# Patient Record
Sex: FEMALE | Race: BLACK OR AFRICAN AMERICAN | NOT HISPANIC OR LATINO | Employment: OTHER | URBAN - METROPOLITAN AREA
[De-identification: names, ages, dates, MRNs, and addresses within clinical notes are randomized per-mention and may not be internally consistent; named-entity substitution may affect disease eponyms.]

---

## 2017-05-23 ENCOUNTER — OFFICE VISIT (OUTPATIENT)
Dept: INTERNAL MEDICINE | Facility: CLINIC | Age: 67
End: 2017-05-23
Payer: MEDICARE

## 2017-05-23 VITALS
DIASTOLIC BLOOD PRESSURE: 60 MMHG | HEART RATE: 79 BPM | BODY MASS INDEX: 23.94 KG/M2 | HEIGHT: 69 IN | OXYGEN SATURATION: 99 % | SYSTOLIC BLOOD PRESSURE: 104 MMHG | WEIGHT: 161.63 LBS

## 2017-05-23 DIAGNOSIS — Z78.0 POST-MENOPAUSE: ICD-10-CM

## 2017-05-23 DIAGNOSIS — Z00.00 ANNUAL PHYSICAL EXAM: Primary | ICD-10-CM

## 2017-05-23 DIAGNOSIS — M85.80 OSTEOPENIA, UNSPECIFIED LOCATION: ICD-10-CM

## 2017-05-23 DIAGNOSIS — Z12.31 SCREENING MAMMOGRAM, ENCOUNTER FOR: ICD-10-CM

## 2017-05-23 DIAGNOSIS — Z72.0 TOBACCO ABUSE: ICD-10-CM

## 2017-05-23 DIAGNOSIS — E78.5 HYPERLIPIDEMIA, UNSPECIFIED HYPERLIPIDEMIA TYPE: ICD-10-CM

## 2017-05-23 DIAGNOSIS — Z12.11 SCREENING FOR MALIGNANT NEOPLASM OF COLON: ICD-10-CM

## 2017-05-23 DIAGNOSIS — E55.9 VITAMIN D DEFICIENCY DISEASE: ICD-10-CM

## 2017-05-23 DIAGNOSIS — Z12.2 ENCOUNTER FOR SCREENING FOR LUNG CANCER: ICD-10-CM

## 2017-05-23 DIAGNOSIS — R73.03 PREDIABETES: ICD-10-CM

## 2017-05-23 PROCEDURE — 99999 PR PBB SHADOW E&M-EST. PATIENT-LVL V: CPT | Mod: PBBFAC,,, | Performed by: FAMILY MEDICINE

## 2017-05-23 PROCEDURE — 99499 UNLISTED E&M SERVICE: CPT | Mod: S$GLB,,, | Performed by: FAMILY MEDICINE

## 2017-05-23 PROCEDURE — 99397 PER PM REEVAL EST PAT 65+ YR: CPT | Mod: S$GLB,,, | Performed by: FAMILY MEDICINE

## 2017-05-23 RX ORDER — ATORVASTATIN CALCIUM 20 MG/1
TABLET, FILM COATED ORAL
Qty: 90 TABLET | Refills: 3 | Status: SHIPPED | OUTPATIENT
Start: 2017-05-23 | End: 2018-05-01 | Stop reason: SDUPTHER

## 2017-05-23 RX ORDER — CHOLECALCIFEROL (VITAMIN D3) 25 MCG
1000 TABLET ORAL DAILY
COMMUNITY

## 2017-05-23 NOTE — PROGRESS NOTES
"Subjective:       Patient ID: Verito Lamb is a 66 y.o. female.    Chief Complaint: Annual Exam    HPI   65 y/o with HLPD, Vit d def, Prediabetes, Osteopenia is here for her annual exam  Denies f/n/v/d/constipation/cp/sob, reports she pee's a lot at night for years, she drinks coffee at night, denies dysuria/hematuria/hesitancy.  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   Respiratory: Negative for shortness of breath.    Cardiovascular: Negative for chest pain, palpitations and leg swelling.   Gastrointestinal: Negative for constipation, diarrhea, nausea and vomiting.   Genitourinary: Negative for difficulty urinating and dysuria.   Skin: Negative for rash.   Psychiatric/Behavioral: Negative for sleep disturbance.       Objective:      /60   Pulse 79   Ht 5' 9" (1.753 m)   Wt 73.3 kg (161 lb 9.6 oz)   SpO2 99%   BMI 23.86 kg/m²   Physical Exam   Constitutional: She appears well-developed and well-nourished.   HENT:   Head: Normocephalic and atraumatic.   Mouth/Throat: No oropharyngeal exudate.   edentulous   Neck: Normal range of motion. Neck supple. No thyromegaly present.   Cardiovascular: Normal rate, regular rhythm and normal heart sounds.    Pulmonary/Chest: Effort normal and breath sounds normal. No respiratory distress.   Abdominal: Soft. Bowel sounds are normal. She exhibits no distension.   Musculoskeletal: She exhibits no edema.   Lymphadenopathy:     She has no cervical adenopathy.   Neurological: She is alert.   Skin: Skin is warm and dry.   Psychiatric: She has a normal mood and affect.   Nursing note and vitals reviewed.      Assessment:       1. Annual physical exam    2. Vitamin D deficiency disease    3. Hyperlipidemia, unspecified hyperlipidemia type    4. Prediabetes    5. Screening for " malignant neoplasm of colon    6. Screening mammogram, encounter for    7. Post-menopause    8. Tobacco abuse    9. Encounter for screening for lung cancer    10. Osteopenia, unspecified location        Plan:   Verito was seen today for annual exam.    Diagnoses and all orders for this visit:    Annual physical exam  -     CBC auto differential; Future  -     Comprehensive metabolic panel; Future  -     TSH; Future  -     Urinalysis; Future    Vitamin D deficiency disease  -     Vitamin D; Future    Hyperlipidemia, unspecified hyperlipidemia type  -     atorvastatin (LIPITOR) 20 MG tablet; TAKE 1 TABLET(20 MG) BY MOUTH EVERY DAY  -     Lipid panel; Future  -     Urinalysis; Future    Prediabetes  -     Hemoglobin A1c; Future    Screening for malignant neoplasm of colon  -     Case request GI: COLONOSCOPY    Screening mammogram, encounter for  -     Mammo Digital Screening Bilat with CAD; Future    Post-menopause  -     DXA Bone Density Spine And Hip; Future    Tobacco abuse  -     Ambulatory referral to Smoking Cessation Program    Encounter for screening for lung cancer  -     CT Chest Lung Screening Low Dose; Future    Osteopenia, unspecified location

## 2017-07-25 ENCOUNTER — TELEPHONE (OUTPATIENT)
Dept: INTERNAL MEDICINE | Facility: CLINIC | Age: 67
End: 2017-07-25

## 2017-07-25 ENCOUNTER — HOSPITAL ENCOUNTER (OUTPATIENT)
Dept: RADIOLOGY | Facility: HOSPITAL | Age: 67
Discharge: HOME | End: 2017-07-25
Attending: FAMILY MEDICINE
Payer: MEDICARE

## 2017-07-25 VITALS — HEIGHT: 69 IN | BODY MASS INDEX: 23.85 KG/M2 | WEIGHT: 161 LBS

## 2017-07-25 DIAGNOSIS — Z12.31 SCREENING MAMMOGRAM, ENCOUNTER FOR: ICD-10-CM

## 2017-07-25 PROCEDURE — 77067 SCR MAMMO BI INCL CAD: CPT | Mod: 26,,, | Performed by: RADIOLOGY

## 2017-07-25 PROCEDURE — 77067 SCR MAMMO BI INCL CAD: CPT | Mod: TC

## 2017-07-25 NOTE — TELEPHONE ENCOUNTER
Please let her know that her blood work looks good overall.  Her blood sugar is still in the  Prediabetic range.  Her good cholesterol, the HDL, is too low, please verify that she is taking Lipitor daily,advise that she take an over-the-counter Fish oil supplement daily along with increasing good fat like olive oil in her diet.  Regular activity can help also improve the HDL.

## 2017-07-26 ENCOUNTER — TELEPHONE (OUTPATIENT)
Dept: RADIOLOGY | Facility: HOSPITAL | Age: 67
End: 2017-07-26

## 2017-07-26 NOTE — TELEPHONE ENCOUNTER
Spoke with patient and explained mammogram findings.Patient expressed understanding of results. Patient is scheduled for a abnormal mammogram follow up appointment at [facility] on 8/2/17

## 2017-08-01 ENCOUNTER — TELEPHONE (OUTPATIENT)
Dept: INTERNAL MEDICINE | Facility: CLINIC | Age: 67
End: 2017-08-01

## 2017-08-01 DIAGNOSIS — R31.9 HEMATURIA: Primary | ICD-10-CM

## 2017-08-02 ENCOUNTER — HOSPITAL ENCOUNTER (OUTPATIENT)
Dept: RADIOLOGY | Facility: CLINIC | Age: 67
Discharge: HOME | End: 2017-08-02
Attending: FAMILY MEDICINE
Payer: MEDICARE

## 2017-08-02 DIAGNOSIS — Z78.0 POST-MENOPAUSE: ICD-10-CM

## 2017-08-02 PROCEDURE — 77080 DXA BONE DENSITY AXIAL: CPT | Mod: 26,,, | Performed by: INTERNAL MEDICINE

## 2017-08-07 ENCOUNTER — TELEPHONE (OUTPATIENT)
Dept: INTERNAL MEDICINE | Facility: CLINIC | Age: 67
End: 2017-08-07

## 2017-08-07 NOTE — TELEPHONE ENCOUNTER
Please let her know, her urine culture was normal.  Please let pt know her bone density shows that she has osteopenia, this is a precursor to osteoporosis.  I recommend her take a calcium supplement of 1200mg daily and Vitamin D3 2,000 IU daily.  She should try to incorporate some strength training into her routine.  We can repeat bone scan in 2-4 years.

## 2017-08-07 NOTE — TELEPHONE ENCOUNTER
Inform pt of lab results and to start Ca and D3 vitamins along with strength training. Pt verbalized understanding. Pt has no further questions or concerns.

## 2018-05-01 ENCOUNTER — OFFICE VISIT (OUTPATIENT)
Dept: INTERNAL MEDICINE | Facility: CLINIC | Age: 68
End: 2018-05-01
Payer: MEDICARE

## 2018-05-01 VITALS
BODY MASS INDEX: 25.64 KG/M2 | WEIGHT: 169.19 LBS | HEIGHT: 68 IN | SYSTOLIC BLOOD PRESSURE: 116 MMHG | OXYGEN SATURATION: 97 % | HEART RATE: 92 BPM | DIASTOLIC BLOOD PRESSURE: 72 MMHG

## 2018-05-01 DIAGNOSIS — Z79.899 OTHER LONG TERM (CURRENT) DRUG THERAPY: ICD-10-CM

## 2018-05-01 DIAGNOSIS — R35.0 URINARY FREQUENCY: ICD-10-CM

## 2018-05-01 DIAGNOSIS — M85.80 OSTEOPENIA, UNSPECIFIED LOCATION: ICD-10-CM

## 2018-05-01 DIAGNOSIS — E78.5 HYPERLIPIDEMIA, UNSPECIFIED HYPERLIPIDEMIA TYPE: ICD-10-CM

## 2018-05-01 DIAGNOSIS — Z12.11 SCREENING FOR COLON CANCER: ICD-10-CM

## 2018-05-01 DIAGNOSIS — Z72.0 TOBACCO ABUSE: ICD-10-CM

## 2018-05-01 DIAGNOSIS — Z00.00 ANNUAL PHYSICAL EXAM: Primary | ICD-10-CM

## 2018-05-01 DIAGNOSIS — E55.9 VITAMIN D DEFICIENCY DISEASE: ICD-10-CM

## 2018-05-01 DIAGNOSIS — R73.03 PREDIABETES: ICD-10-CM

## 2018-05-01 LAB
BACTERIA #/AREA URNS AUTO: ABNORMAL /HPF
BILIRUB SERPL-MCNC: ABNORMAL MG/DL
BILIRUB UR QL STRIP: NEGATIVE
BLOOD URINE, POC: ABNORMAL
CLARITY UR REFRACT.AUTO: CLEAR
COLOR UR AUTO: YELLOW
COLOR, POC UA: ABNORMAL
GLUCOSE UR QL STRIP: NEGATIVE
GLUCOSE UR QL STRIP: NORMAL
HGB UR QL STRIP: ABNORMAL
KETONES UR QL STRIP: ABNORMAL
KETONES UR QL STRIP: NEGATIVE
LEUKOCYTE ESTERASE UR QL STRIP: ABNORMAL
LEUKOCYTE ESTERASE URINE, POC: ABNORMAL
MICROSCOPIC COMMENT: ABNORMAL
NITRITE UR QL STRIP: NEGATIVE
NITRITE, POC UA: ABNORMAL
NON-SQ EPI CELLS #/AREA URNS AUTO: <1 /HPF
PH UR STRIP: 6 [PH] (ref 5–8)
PH, POC UA: 5
PROT UR QL STRIP: NEGATIVE
PROTEIN, POC: ABNORMAL
RBC #/AREA URNS AUTO: 20 /HPF (ref 0–4)
SP GR UR STRIP: 1.01 (ref 1–1.03)
SPECIFIC GRAVITY, POC UA: 1.01
SQUAMOUS #/AREA URNS AUTO: 7 /HPF
URN SPEC COLLECT METH UR: ABNORMAL
UROBILINOGEN UR STRIP-ACNC: NEGATIVE EU/DL
UROBILINOGEN, POC UA: NORMAL
WBC #/AREA URNS AUTO: 7 /HPF (ref 0–5)

## 2018-05-01 PROCEDURE — 81002 URINALYSIS NONAUTO W/O SCOPE: CPT | Mod: S$GLB,,, | Performed by: FAMILY MEDICINE

## 2018-05-01 PROCEDURE — 81001 URINALYSIS AUTO W/SCOPE: CPT

## 2018-05-01 PROCEDURE — 99397 PER PM REEVAL EST PAT 65+ YR: CPT | Mod: 25,S$GLB,, | Performed by: FAMILY MEDICINE

## 2018-05-01 PROCEDURE — 99999 PR PBB SHADOW E&M-EST. PATIENT-LVL IV: CPT | Mod: PBBFAC,,, | Performed by: FAMILY MEDICINE

## 2018-05-01 PROCEDURE — 87086 URINE CULTURE/COLONY COUNT: CPT

## 2018-05-01 RX ORDER — ATORVASTATIN CALCIUM 20 MG/1
TABLET, FILM COATED ORAL
Qty: 90 TABLET | Refills: 3 | Status: SHIPPED | OUTPATIENT
Start: 2018-05-01 | End: 2018-10-12

## 2018-05-01 RX ORDER — BUPROPION HYDROCHLORIDE 150 MG/1
150 TABLET ORAL DAILY
Qty: 30 TABLET | Refills: 3 | Status: SHIPPED | OUTPATIENT
Start: 2018-05-01 | End: 2019-03-19

## 2018-05-01 NOTE — PROGRESS NOTES
"Subjective:       Patient ID: Verito Lamb is a 67 y.o. female.    Chief Complaint: Annual Exam    HPI  66 y/o current smoker with HLPD, Vit d def, Prediabetes, Osteopenia is here for her annual exam.  Denies f/n/v/heartburn/d/constipation/cp/sob, reports urinary frequency, she drinks coffee 2-4 cups a day, denies dysuria/hematuria/hesitancy.  Sleeping well, appetite fair, not doing any exercise but walks a lot, taking care of 93 y/o mom with Alzheimers  Script for tdap and zoster given  Tobacco use: 1 pack every 3 days, discussed wellbutrin and chantix, did have smoking cessation calls but it did not help, felt he talked too much; CT lung cancer screen, plans to set up  Eye exam due  Vaccines: gave script for tdap and Shingrix    Review of Systems   Constitutional: Negative for activity change, appetite change, fatigue and fever.   Respiratory: Negative for cough and shortness of breath.    Cardiovascular: Negative for chest pain, palpitations and leg swelling.   Gastrointestinal: Negative for constipation, diarrhea, nausea and vomiting.   Genitourinary: Positive for frequency. Negative for difficulty urinating, dysuria, hematuria and urgency.   Skin: Negative for rash.   Neurological: Negative for dizziness and light-headedness.   Psychiatric/Behavioral: Negative for sleep disturbance.       Objective:      /72   Pulse 92   Ht 5' 8" (1.727 m)   Wt 76.8 kg (169 lb 3.3 oz)   SpO2 97%   BMI 25.73 kg/m²   Physical Exam   Constitutional: She appears well-developed and well-nourished.   HENT:   Head: Normocephalic and atraumatic.   Mouth/Throat: No oropharyngeal exudate.   Neck: Normal range of motion. Neck supple. No thyromegaly present.   Cardiovascular: Normal rate, regular rhythm and normal heart sounds.    Pulmonary/Chest: Effort normal and breath sounds normal. No respiratory distress.   Abdominal: Soft. Bowel sounds are normal. She exhibits no distension. There is no tenderness.   Musculoskeletal: " She exhibits no edema.   Lymphadenopathy:     She has no cervical adenopathy.   Neurological: She is alert.   Skin: Skin is warm and dry.   Psychiatric: She has a normal mood and affect.   Nursing note and vitals reviewed.      Assessment:       1. Annual physical exam    2. Hyperlipidemia, unspecified hyperlipidemia type    3. Prediabetes    4. Vitamin D deficiency disease    5. Tobacco abuse    6. Osteopenia, unspecified location    7. Other long term (current) drug therapy     8. Screening for colon cancer    9. Urinary frequency        Plan:   Verito was seen today for annual exam.    Diagnoses and all orders for this visit:    Annual physical exam  -     CBC auto differential; Future  -     Comprehensive metabolic panel; Future  -     Hemoglobin A1c; Future  -     Lipid panel; Future  -     TSH; Future  -     Vitamin D; Future  -     Ambulatory referral to Optometry    Hyperlipidemia, unspecified hyperlipidemia type  -     atorvastatin (LIPITOR) 20 MG tablet; TAKE 1 TABLET(20 MG) BY MOUTH EVERY DAY  -     Lipid panel; Future  -     Ambulatory Referral to [facility] ([facility])  -     [facility] ([facility])  -     [facility] Patient Entered [facility] ([facility])  -     Ambulatory referral to Optometry    Prediabetes  -     Hemoglobin A1c; Future  -     Ambulatory Referral to [facility] ([facility])  -     [email or web address]  -     [facility] Patient Entered [facility] ([facility])  -     Ambulatory referral to Optometry    Vitamin D deficiency disease  -     Vitamin D; Future    Tobacco abuse  -     buPROPion (WELLBUTRIN XL) 150 MG TB24 tablet; Take 1 tablet (150 mg total) by mouth once daily.    Osteopenia, unspecified location    Other long term (current) drug therapy   -     CBC auto differential; Future  -     TSH; Future    Screening for colon cancer  -     Case request GI: COLONOSCOPY    Urinary frequency  -     POCT URINE DIPSTICK WITHOUT  MICROSCOPE  -     URINALYSIS; Future  -     Urine culture; Future

## 2018-05-02 ENCOUNTER — HOSPITAL ENCOUNTER (OUTPATIENT)
Dept: RADIOLOGY | Facility: HOSPITAL | Age: 68
Discharge: HOME | End: 2018-05-02
Attending: FAMILY MEDICINE
Payer: MEDICARE

## 2018-05-02 ENCOUNTER — TELEPHONE (OUTPATIENT)
Dept: INTERNAL MEDICINE | Facility: CLINIC | Age: 68
End: 2018-05-02

## 2018-05-02 DIAGNOSIS — R92.8 ABNORMAL MAMMOGRAM: ICD-10-CM

## 2018-05-02 PROCEDURE — 77061 BREAST TOMOSYNTHESIS UNI: CPT | Mod: TC,PO

## 2018-05-02 PROCEDURE — 77062 BREAST TOMOSYNTHESIS BI: CPT | Mod: 26,,, | Performed by: RADIOLOGY

## 2018-05-02 PROCEDURE — 77062 BREAST TOMOSYNTHESIS BI: CPT | Mod: TC,PO

## 2018-05-02 PROCEDURE — 77066 DX MAMMO INCL CAD BI: CPT | Mod: 26,,, | Performed by: RADIOLOGY

## 2018-05-02 PROCEDURE — 77065 DX MAMMO INCL CAD UNI: CPT | Mod: TC,PO

## 2018-05-02 NOTE — TELEPHONE ENCOUNTER
Your mammogram is normal, you will need repeat in one year. Your labs look good except your Vitamin D level is too low.  I recommend Vitamin D3 2,000 IU to be taken daily, you can get this over the counter.

## 2018-05-03 LAB — BACTERIA UR CULT: NO GROWTH

## 2018-05-06 ENCOUNTER — TELEPHONE (OUTPATIENT)
Dept: INTERNAL MEDICINE | Facility: CLINIC | Age: 68
End: 2018-05-06

## 2018-06-05 ENCOUNTER — TELEPHONE (OUTPATIENT)
Dept: ENDOSCOPY | Facility: HOSPITAL | Age: 68
End: 2018-06-05

## 2018-06-05 NOTE — TELEPHONE ENCOUNTER
Contacted Pt to schedule colonoscopy, Pt not ready to schedule at this time, Pt to call back to schedule, number provided [phone].

## 2018-06-30 DIAGNOSIS — E78.5 HYPERLIPIDEMIA, UNSPECIFIED HYPERLIPIDEMIA TYPE: ICD-10-CM

## 2018-06-30 RX ORDER — ATORVASTATIN CALCIUM 20 MG/1
TABLET, FILM COATED ORAL
Qty: 90 TABLET | Refills: 0 | Status: SHIPPED | OUTPATIENT
Start: 2018-06-30 | End: 2018-10-12 | Stop reason: SDUPTHER

## 2018-10-12 DIAGNOSIS — E78.5 HYPERLIPIDEMIA, UNSPECIFIED HYPERLIPIDEMIA TYPE: ICD-10-CM

## 2018-10-12 RX ORDER — ATORVASTATIN CALCIUM 20 MG/1
TABLET, FILM COATED ORAL
Qty: 90 TABLET | Refills: 0 | Status: SHIPPED | OUTPATIENT
Start: 2018-10-12 | End: 2019-03-19 | Stop reason: SDUPTHER

## 2018-10-18 ENCOUNTER — PATIENT OUTREACH (OUTPATIENT)
Dept: ADMINISTRATIVE | Facility: HOSPITAL | Age: 68
End: 2018-10-18

## 2018-10-18 NOTE — PROGRESS NOTES
[facility] is committed to your overall health.  To help you get the most out of each of your visits, we will review your information to make sure you are up to date on all of your recommended tests and/or procedures.       Your PCP  Sarita Live MD   found that you may be due for:       Health Maintenance Due   Topic Date Due    TETANUS VACCINE  08/21/1967    Colonoscopy  08/21/1999    Zoster Vaccine  08/21/2009    Influenza Vaccine  08/01/2018             If you have had any of the above done at another facility, please bring the records or information with you so that your record at [facility] will be complete.  If you would like to schedule any of these, please contact me.     If you are currently taking medication, please bring it with you to your appointment for review.     Also, if you have any type of Advanced Directives, please bring them with you to your office visit so we may scan them into your chart.       Thank you for Choosing [facility] for your healthcare needs.        Additional Information  If you have questions, you can email [email or web address] or call [phone]  to talk to our [facility] staff. Remember, [facility] is NOT to be used for urgent needs. For medical emergencies, dial 911.

## 2018-11-30 ENCOUNTER — TELEPHONE (OUTPATIENT)
Dept: ENDOSCOPY | Facility: HOSPITAL | Age: 68
End: 2018-11-30

## 2019-03-06 ENCOUNTER — PATIENT OUTREACH (OUTPATIENT)
Dept: ADMINISTRATIVE | Facility: HOSPITAL | Age: 69
End: 2019-03-06

## 2019-03-06 DIAGNOSIS — Z12.39 SCREENING FOR BREAST CANCER: Primary | ICD-10-CM

## 2019-03-19 ENCOUNTER — OFFICE VISIT (OUTPATIENT)
Dept: INTERNAL MEDICINE | Facility: CLINIC | Age: 69
End: 2019-03-19
Payer: MEDICARE

## 2019-03-19 VITALS
WEIGHT: 161.81 LBS | SYSTOLIC BLOOD PRESSURE: 112 MMHG | HEART RATE: 86 BPM | BODY MASS INDEX: 24.52 KG/M2 | OXYGEN SATURATION: 99 % | DIASTOLIC BLOOD PRESSURE: 74 MMHG | HEIGHT: 68 IN

## 2019-03-19 DIAGNOSIS — M85.80 OSTEOPENIA, UNSPECIFIED LOCATION: ICD-10-CM

## 2019-03-19 DIAGNOSIS — Z12.2 ENCOUNTER FOR SCREENING FOR LUNG CANCER: ICD-10-CM

## 2019-03-19 DIAGNOSIS — E78.5 HYPERLIPIDEMIA, UNSPECIFIED HYPERLIPIDEMIA TYPE: Primary | ICD-10-CM

## 2019-03-19 DIAGNOSIS — Z01.00 EYE EXAM, ROUTINE: ICD-10-CM

## 2019-03-19 DIAGNOSIS — J30.1 SEASONAL ALLERGIC RHINITIS DUE TO POLLEN: ICD-10-CM

## 2019-03-19 DIAGNOSIS — Z87.891 PERSONAL HISTORY OF NICOTINE DEPENDENCE: ICD-10-CM

## 2019-03-19 DIAGNOSIS — R73.03 PREDIABETES: ICD-10-CM

## 2019-03-19 DIAGNOSIS — E55.9 VITAMIN D DEFICIENCY DISEASE: ICD-10-CM

## 2019-03-19 DIAGNOSIS — Z12.9 SCREENING FOR CANCER: ICD-10-CM

## 2019-03-19 DIAGNOSIS — Z72.0 TOBACCO ABUSE: ICD-10-CM

## 2019-03-19 PROCEDURE — 1101F PT FALLS ASSESS-DOCD LE1/YR: CPT | Mod: CPTII,S$GLB,, | Performed by: FAMILY MEDICINE

## 2019-03-19 PROCEDURE — 99499 UNLISTED E&M SERVICE: CPT | Mod: S$GLB,,, | Performed by: FAMILY MEDICINE

## 2019-03-19 PROCEDURE — 99499 RISK ADDL DX/OHS AUDIT: ICD-10-PCS | Mod: S$GLB,,, | Performed by: FAMILY MEDICINE

## 2019-03-19 PROCEDURE — 99999 PR PBB SHADOW E&M-EST. PATIENT-LVL IV: CPT | Mod: PBBFAC,,, | Performed by: FAMILY MEDICINE

## 2019-03-19 PROCEDURE — 1101F PR PT FALLS ASSESS DOC 0-1 FALLS W/OUT INJ PAST YR: ICD-10-PCS | Mod: CPTII,S$GLB,, | Performed by: FAMILY MEDICINE

## 2019-03-19 PROCEDURE — 99214 PR OFFICE/OUTPT VISIT, EST, LEVL IV, 30-39 MIN: ICD-10-PCS | Mod: S$GLB,,, | Performed by: FAMILY MEDICINE

## 2019-03-19 PROCEDURE — 99999 PR PBB SHADOW E&M-EST. PATIENT-LVL IV: ICD-10-PCS | Mod: PBBFAC,,, | Performed by: FAMILY MEDICINE

## 2019-03-19 PROCEDURE — 99214 OFFICE O/P EST MOD 30 MIN: CPT | Mod: S$GLB,,, | Performed by: FAMILY MEDICINE

## 2019-03-19 RX ORDER — FLUTICASONE PROPIONATE 50 MCG
1 SPRAY, SUSPENSION (ML) NASAL DAILY
Qty: 16 G | Refills: 3 | Status: SHIPPED | OUTPATIENT
Start: 2019-03-19 | End: 2019-05-16

## 2019-03-19 RX ORDER — ATORVASTATIN CALCIUM 20 MG/1
TABLET, FILM COATED ORAL
Qty: 90 TABLET | Refills: 1 | Status: SHIPPED | OUTPATIENT
Start: 2019-03-19 | End: 2019-05-16 | Stop reason: SDUPTHER

## 2019-03-19 RX ORDER — LORATADINE 10 MG/1
10 TABLET ORAL DAILY PRN
Qty: 30 TABLET | Refills: 3 | Status: SHIPPED | OUTPATIENT
Start: 2019-03-19 | End: 2021-05-19

## 2019-03-19 NOTE — PROGRESS NOTES
"Subjective:       Patient ID: Verito Lamb is a 68 y.o. female.    Chief Complaint: Hyperlipidemia    HPI  69 y/o current smoker with HLPD, Vit d def, Prediabetes, Osteopenia is here for follow up.    Her mom recently passed away she was 89, she is staying busy with her [employer], she does [employer] study and [employer].  She is coping well, she tries to stay active.   She is feeling good, she has been having runny nose, pnd, sneezing, itchy eyes, she denies cough/sinus pressure/nasal congestion/f/n/v/d/constipation/cp/sob/urinary sx.  She is sleeping ok.    Tobacco use: 1 pack every 3 days, did well with Wellbutrin wants to try again; CT lung cancer screen  GYN: mmg due  Eye exam due  Vaccines: gave script for tdap and Shingrix    Review of Systems   Constitutional: Negative for activity change, appetite change, fatigue and fever.   HENT: Positive for postnasal drip, rhinorrhea and sneezing. Negative for congestion, sinus pressure and sore throat.    Eyes: Positive for itching.   Respiratory: Negative for cough and shortness of breath.    Cardiovascular: Negative for chest pain, palpitations and leg swelling.   Gastrointestinal: Negative for constipation, diarrhea, nausea and vomiting.   Genitourinary: Negative for difficulty urinating and dysuria.   Skin: Negative for rash.   Neurological: Negative for dizziness and light-headedness.   Psychiatric/Behavioral: Negative for sleep disturbance.       Objective:      /74 (BP Location: Right leg, Patient Position: Sitting, BP Method: X-Large (Manual))   Pulse 86   Ht 5' 8" (1.727 m)   Wt 73.4 kg (161 lb 13.1 oz)   SpO2 99%   BMI 24.60 kg/m²   Physical Exam   Constitutional: She appears well-developed and well-nourished.   HENT:   Head: Normocephalic and atraumatic.   Mouth/Throat: No oropharyngeal exudate.   Mild posterior pharyngeal erythema  Inflamed nasal turbinates bilaterally     Neck: Normal range of motion. Neck supple. No thyromegaly present. "   Cardiovascular: Normal rate, regular rhythm and normal heart sounds.   Pulmonary/Chest: Effort normal and breath sounds normal. No respiratory distress.   Abdominal: Soft. Bowel sounds are normal. She exhibits no distension. There is no tenderness.   Musculoskeletal: She exhibits no edema.   Lymphadenopathy:     She has no cervical adenopathy.   Neurological: She is alert.   Skin: Skin is warm and dry.   Psychiatric: She has a normal mood and affect.   Nursing note and vitals reviewed.      Assessment:       1. Hyperlipidemia, unspecified hyperlipidemia type    2. Eye exam, routine    3. Screening for cancer    4. Encounter for screening for lung cancer    5. Prediabetes    6. Tobacco abuse    7. Vitamin D deficiency disease    8. Osteopenia, unspecified location    9. Personal history of nicotine dependence     10. Seasonal allergic rhinitis due to pollen        Plan:   Verito was seen today for hyperlipidemia.    Diagnoses and all orders for this visit:    Hyperlipidemia, unspecified hyperlipidemia type  -     atorvastatin (LIPITOR) 20 MG tablet; TAKE 1 TABLET(20 MG) BY MOUTH EVERY DAY  -     CBC auto differential; Future  -     Comprehensive metabolic panel; Future  -     Lipid panel; Future  -     TSH; Future  -     Urinalysis; Future    Eye exam, routine  -     Ambulatory referral to Optometry    Screening for cancer  -     CT Chest Lung Screening Low Dose; Future    Encounter for screening for lung cancer  -     CT Chest Lung Screening Low Dose; Future    Prediabetes  -     Hemoglobin A1c; Future  -     TSH; Future  -     Urinalysis; Future    Tobacco abuse  -     CT Chest Lung Screening Low Dose; Future    Vitamin D deficiency disease  -     Vitamin D; Future    Osteopenia, unspecified location  -     Vitamin D; Future    Personal history of nicotine dependence   -     CT Chest Lung Screening Low Dose; Future    Seasonal allergic rhinitis due to pollen  -     fluticasone (FLONASE) 50 mcg/actuation nasal  spray; 1 spray (50 mcg total) by Each Nare route once daily.

## 2019-03-26 ENCOUNTER — TELEPHONE (OUTPATIENT)
Dept: INTERNAL MEDICINE | Facility: CLINIC | Age: 69
End: 2019-03-26

## 2019-03-26 ENCOUNTER — PATIENT OUTREACH (OUTPATIENT)
Dept: ADMINISTRATIVE | Facility: HOSPITAL | Age: 69
End: 2019-03-26

## 2019-03-26 NOTE — PROGRESS NOTES
[facility] is committed to your overall health.  To help you get the most out of each of your visits, we will review your information to make sure you are up to date on all of your recommended tests and/or procedures.       Your PCP  Sarita Live MD   found that you may be due for:       Health Maintenance Due   Topic Date Due    TETANUS VACCINE  08/21/1967    Colonoscopy  08/21/1999    Zoster Vaccine  08/21/2009    Influenza Vaccine  08/01/2018             If you have had any of the above done at another facility, please bring the records or information with you so that your record at [facility] will be complete.  If you would like to schedule any of these, please contact me.     If you are currently taking medication, please bring it with you to your appointment for review.     Also, if you have any type of Advanced Directives, please bring them with you to your office visit so we may scan them into your chart.       Thank you for Choosing [facility] for your healthcare needs.        Additional Information  If you have questions, you can email [email or web address] or call [phone]  to talk to our [facility] staff. Remember, [facility] is NOT to be used for urgent needs. For medical emergencies, dial 911.

## 2019-03-26 NOTE — TELEPHONE ENCOUNTER
----- Message from Olive Carranza sent at 3/26/2019  2:24 PM CDT -----  Contact: Pt    Name of Who is Calling:LOS BANDA [[id number]]    What is the request in detail: Patient would like a call back regarding orders for blood work , patient states no one ever called her back . Patient states she did not get her vitamin D 1000 units Tab and loratadine (CLARITIN) 10 mg tablet    Can the clinic reply by [facility]: no    What Number to Call Back [facility] not [facility]: [phone]

## 2019-04-25 ENCOUNTER — PATIENT OUTREACH (OUTPATIENT)
Dept: ADMINISTRATIVE | Facility: HOSPITAL | Age: 69
End: 2019-04-25

## 2019-04-25 NOTE — PROGRESS NOTES
Chart review completed. I did speak to the patient about getting her colonoscopy and that a case request will be placed per Dr. Live. She verbalized understanding.      Yanet REYNOSO LPN  Clinical Care Coordinator  Internal Medicine  [facility]

## 2019-05-02 ENCOUNTER — LAB VISIT (OUTPATIENT)
Dept: LAB | Facility: HOSPITAL | Age: 69
End: 2019-05-02
Attending: FAMILY MEDICINE
Payer: MEDICARE

## 2019-05-02 DIAGNOSIS — M85.80 OSTEOPENIA, UNSPECIFIED LOCATION: ICD-10-CM

## 2019-05-02 DIAGNOSIS — R73.03 PREDIABETES: ICD-10-CM

## 2019-05-02 DIAGNOSIS — E78.5 HYPERLIPIDEMIA, UNSPECIFIED HYPERLIPIDEMIA TYPE: ICD-10-CM

## 2019-05-02 DIAGNOSIS — E55.9 VITAMIN D DEFICIENCY DISEASE: ICD-10-CM

## 2019-05-02 LAB
25(OH)D3+25(OH)D2 SERPL-MCNC: 20 NG/ML (ref 30–96)
ALBUMIN SERPL BCP-MCNC: 3.6 G/DL (ref 3.5–5.2)
ALP SERPL-CCNC: 81 U/L (ref 55–135)
ALT SERPL W/O P-5'-P-CCNC: 13 U/L (ref 10–44)
ANION GAP SERPL CALC-SCNC: 8 MMOL/L (ref 8–16)
AST SERPL-CCNC: 17 U/L (ref 10–40)
BASOPHILS # BLD AUTO: 0.01 K/UL (ref 0–0.2)
BASOPHILS NFR BLD: 0.2 % (ref 0–1.9)
BILIRUB SERPL-MCNC: 0.5 MG/DL (ref 0.1–1)
BUN SERPL-MCNC: 13 MG/DL (ref 8–23)
CALCIUM SERPL-MCNC: 9.2 MG/DL (ref 8.7–10.5)
CHLORIDE SERPL-SCNC: 107 MMOL/L (ref 95–110)
CHOLEST SERPL-MCNC: 250 MG/DL (ref 120–199)
CHOLEST/HDLC SERPL: 5.8 {RATIO} (ref 2–5)
CO2 SERPL-SCNC: 25 MMOL/L (ref 23–29)
CREAT SERPL-MCNC: 0.8 MG/DL (ref 0.5–1.4)
DIFFERENTIAL METHOD: ABNORMAL
EOSINOPHIL # BLD AUTO: 0 K/UL (ref 0–0.5)
EOSINOPHIL NFR BLD: 0 % (ref 0–8)
ERYTHROCYTE [DISTWIDTH] IN BLOOD BY AUTOMATED COUNT: 14.7 % (ref 11.5–14.5)
EST. GFR  (AFRICAN AMERICAN): >60 ML/MIN/1.73 M^2
EST. GFR  (NON AFRICAN AMERICAN): >60 ML/MIN/1.73 M^2
ESTIMATED AVG GLUCOSE: 114 MG/DL (ref 68–131)
GLUCOSE SERPL-MCNC: 86 MG/DL (ref 70–110)
HBA1C MFR BLD HPLC: 5.6 % (ref 4–5.6)
HCT VFR BLD AUTO: 37.8 % (ref 37–48.5)
HDLC SERPL-MCNC: 43 MG/DL (ref 40–75)
HDLC SERPL: 17.2 % (ref 20–50)
HGB BLD-MCNC: 12.4 G/DL (ref 12–16)
LDLC SERPL CALC-MCNC: 190.6 MG/DL (ref 63–159)
LYMPHOCYTES # BLD AUTO: 2.3 K/UL (ref 1–4.8)
LYMPHOCYTES NFR BLD: 40.8 % (ref 18–48)
MCH RBC QN AUTO: 29.6 PG (ref 27–31)
MCHC RBC AUTO-ENTMCNC: 32.8 G/DL (ref 32–36)
MCV RBC AUTO: 90 FL (ref 82–98)
MONOCYTES # BLD AUTO: 0.4 K/UL (ref 0.3–1)
MONOCYTES NFR BLD: 7.9 % (ref 4–15)
NEUTROPHILS # BLD AUTO: 2.8 K/UL (ref 1.8–7.7)
NEUTROPHILS NFR BLD: 50.9 % (ref 38–73)
NONHDLC SERPL-MCNC: 207 MG/DL
PLATELET # BLD AUTO: 305 K/UL (ref 150–350)
PMV BLD AUTO: 8.7 FL (ref 9.2–12.9)
POTASSIUM SERPL-SCNC: 4.1 MMOL/L (ref 3.5–5.1)
PROT SERPL-MCNC: 7.5 G/DL (ref 6–8.4)
RBC # BLD AUTO: 4.19 M/UL (ref 4–5.4)
SODIUM SERPL-SCNC: 140 MMOL/L (ref 136–145)
TRIGL SERPL-MCNC: 82 MG/DL (ref 30–150)
TSH SERPL DL<=0.005 MIU/L-ACNC: 0.4 UIU/ML (ref 0.4–4)
WBC # BLD AUTO: 5.56 K/UL (ref 3.9–12.7)

## 2019-05-02 PROCEDURE — 83036 HEMOGLOBIN GLYCOSYLATED A1C: CPT

## 2019-05-02 PROCEDURE — 36415 COLL VENOUS BLD VENIPUNCTURE: CPT

## 2019-05-02 PROCEDURE — 85025 COMPLETE CBC W/AUTO DIFF WBC: CPT

## 2019-05-02 PROCEDURE — 80061 LIPID PANEL: CPT

## 2019-05-02 PROCEDURE — 80053 COMPREHEN METABOLIC PANEL: CPT

## 2019-05-02 PROCEDURE — 84443 ASSAY THYROID STIM HORMONE: CPT

## 2019-05-02 PROCEDURE — 82306 VITAMIN D 25 HYDROXY: CPT

## 2019-05-03 ENCOUNTER — TELEPHONE (OUTPATIENT)
Dept: INTERNAL MEDICINE | Facility: CLINIC | Age: 69
End: 2019-05-03

## 2019-05-03 DIAGNOSIS — R82.71 BACTERIURIA: Primary | ICD-10-CM

## 2019-05-03 RX ORDER — NITROFURANTOIN (MACROCRYSTALS) 100 MG/1
100 CAPSULE ORAL EVERY 12 HOURS
Qty: 10 CAPSULE | Refills: 0 | Status: SHIPPED | OUTPATIENT
Start: 2019-05-03 | End: 2019-05-08

## 2019-05-03 NOTE — TELEPHONE ENCOUNTER
Please let the patient/caregiver know that her UA is showing signs of UTI.  Antibiotic has been sent in to her pharmacy.  If she has urinary symptoms next week, another urine sample should be obtained for follow-up urine culture.  Thanks.

## 2019-05-03 NOTE — TELEPHONE ENCOUNTER
Spoke to patient, gave results, patient is  ot currently having any symptoms. She will start the medication today, and let us know how if there are any problems. Patient is already scheduled for an appointment with MIREYA Live on Thursday.

## 2019-05-06 ENCOUNTER — TELEPHONE (OUTPATIENT)
Dept: INTERNAL MEDICINE | Facility: CLINIC | Age: 69
End: 2019-05-06

## 2019-05-06 NOTE — TELEPHONE ENCOUNTER
Spoke to patient, gave results, patient understood and verbalized.     She states she has been taking lipitor, but was not sure of the dose, as she was not near the bottle.     She is taking vitamin  IU. She would like to know if you want her to increase that dose?    She confirmed appointment for Thursday, and will bring all medications, including OTC to the visit.

## 2019-05-06 NOTE — TELEPHONE ENCOUNTER
Please let her know that her blood work looks good overall.  Her cholesterol is very elevated.  Please find out if she has been taking her Lipitor.  Her vitamin-D is also really low please verify the dose and frequency of her supplement.  Looks like she has an upcoming appointment please have her bring in all of her medications an over-the-counter supplements to her appointment.

## 2019-05-07 ENCOUNTER — IMMUNIZATION (OUTPATIENT)
Dept: PHARMACY | Facility: CLINIC | Age: 69
End: 2019-05-07
Payer: MEDICARE

## 2019-05-07 ENCOUNTER — HOSPITAL ENCOUNTER (OUTPATIENT)
Dept: RADIOLOGY | Facility: HOSPITAL | Age: 69
Discharge: HOME | End: 2019-05-07
Attending: FAMILY MEDICINE
Payer: MEDICARE

## 2019-05-07 DIAGNOSIS — Z12.39 SCREENING FOR BREAST CANCER: ICD-10-CM

## 2019-05-07 PROCEDURE — 77067 MAMMO DIGITAL SCREENING BILAT WITH TOMOSYNTHESIS_CAD: ICD-10-PCS | Mod: 26,,, | Performed by: RADIOLOGY

## 2019-05-07 PROCEDURE — 77063 MAMMO DIGITAL SCREENING BILAT WITH TOMOSYNTHESIS_CAD: ICD-10-PCS | Mod: 26,,, | Performed by: RADIOLOGY

## 2019-05-07 PROCEDURE — 77067 SCR MAMMO BI INCL CAD: CPT | Mod: 26,,, | Performed by: RADIOLOGY

## 2019-05-07 PROCEDURE — 77067 SCR MAMMO BI INCL CAD: CPT | Mod: TC

## 2019-05-07 PROCEDURE — 77063 BREAST TOMOSYNTHESIS BI: CPT | Mod: 26,,, | Performed by: RADIOLOGY

## 2019-05-08 ENCOUNTER — TELEPHONE (OUTPATIENT)
Dept: INTERNAL MEDICINE | Facility: CLINIC | Age: 69
End: 2019-05-08

## 2019-05-08 NOTE — TELEPHONE ENCOUNTER
Informed pt her mammogram was normal and that she will need to repeat in a year. Pt understood and had no further questions.

## 2019-05-16 ENCOUNTER — OFFICE VISIT (OUTPATIENT)
Dept: INTERNAL MEDICINE | Facility: CLINIC | Age: 69
End: 2019-05-16
Payer: MEDICARE

## 2019-05-16 VITALS
WEIGHT: 158.75 LBS | HEIGHT: 69 IN | SYSTOLIC BLOOD PRESSURE: 114 MMHG | HEART RATE: 71 BPM | OXYGEN SATURATION: 99 % | BODY MASS INDEX: 23.51 KG/M2 | DIASTOLIC BLOOD PRESSURE: 72 MMHG

## 2019-05-16 DIAGNOSIS — E78.5 HYPERLIPIDEMIA, UNSPECIFIED HYPERLIPIDEMIA TYPE: Primary | ICD-10-CM

## 2019-05-16 DIAGNOSIS — E55.9 VITAMIN D DEFICIENCY DISEASE: ICD-10-CM

## 2019-05-16 DIAGNOSIS — Z72.0 TOBACCO ABUSE: ICD-10-CM

## 2019-05-16 DIAGNOSIS — Z87.440 HISTORY OF UTI: ICD-10-CM

## 2019-05-16 DIAGNOSIS — Z12.11 SCREEN FOR COLON CANCER: ICD-10-CM

## 2019-05-16 PROCEDURE — 99499 RISK ADDL DX/OHS AUDIT: ICD-10-PCS | Mod: S$GLB,,, | Performed by: FAMILY MEDICINE

## 2019-05-16 PROCEDURE — 99499 UNLISTED E&M SERVICE: CPT | Mod: S$GLB,,, | Performed by: FAMILY MEDICINE

## 2019-05-16 PROCEDURE — 99213 PR OFFICE/OUTPT VISIT, EST, LEVL III, 20-29 MIN: ICD-10-PCS | Mod: S$GLB,,, | Performed by: FAMILY MEDICINE

## 2019-05-16 PROCEDURE — 1101F PT FALLS ASSESS-DOCD LE1/YR: CPT | Mod: CPTII,S$GLB,, | Performed by: FAMILY MEDICINE

## 2019-05-16 PROCEDURE — 99999 PR PBB SHADOW E&M-EST. PATIENT-LVL III: CPT | Mod: PBBFAC,,, | Performed by: FAMILY MEDICINE

## 2019-05-16 PROCEDURE — 99213 OFFICE O/P EST LOW 20 MIN: CPT | Mod: S$GLB,,, | Performed by: FAMILY MEDICINE

## 2019-05-16 PROCEDURE — 99999 PR PBB SHADOW E&M-EST. PATIENT-LVL III: ICD-10-PCS | Mod: PBBFAC,,, | Performed by: FAMILY MEDICINE

## 2019-05-16 PROCEDURE — 1101F PR PT FALLS ASSESS DOC 0-1 FALLS W/OUT INJ PAST YR: ICD-10-PCS | Mod: CPTII,S$GLB,, | Performed by: FAMILY MEDICINE

## 2019-05-16 RX ORDER — ATORVASTATIN CALCIUM 20 MG/1
TABLET, FILM COATED ORAL
Qty: 90 TABLET | Refills: 3 | Status: SHIPPED | OUTPATIENT
Start: 2019-05-16 | End: 2019-08-19 | Stop reason: SDUPTHER

## 2019-05-16 NOTE — PROGRESS NOTES
"Subjective:       Patient ID: Verito Lamb is a 68 y.o. female.    Chief Complaint: Hyperlipidemia    HPI  67 y/o current smoker with HLPD, Vit d def, Prediabetes, Osteopenia is here for follow up UTI..     She completed treatment with Macrobid for 5 days.  She denies urinary sx. She is feeling good, denies f/n/v/d/constipation/cp/sob.  She is sleeping ok. Appetite good.  She is not exercising, the [facility] fit was too far for her to drive.     Tobacco use: 1 pack every 3 days, did well with Wellbutrin wants to try again; CT lung cancer screen ordered  GYN: mmg due  Eye exam due  Vaccines: gave script for tdap and Shingrix    Review of Systems   Constitutional: Negative for activity change, appetite change, fatigue and fever.   Respiratory: Negative for shortness of breath.    Cardiovascular: Negative for chest pain, palpitations and leg swelling.   Gastrointestinal: Negative for constipation, diarrhea, nausea and vomiting.   Genitourinary: Negative for difficulty urinating and dysuria.   Skin: Negative for rash.   Neurological: Negative for dizziness and light-headedness.   Psychiatric/Behavioral: Negative for sleep disturbance.       Objective:      /72 (BP Location: Right arm, Patient Position: Sitting, BP Method: X-Large (Manual))   Pulse 71   Ht 5' 9" (1.753 m)   Wt 72 kg (158 lb 11.7 oz)   SpO2 99%   BMI 23.44 kg/m²   Physical Exam   Constitutional: She appears well-developed and well-nourished.   HENT:   Head: Normocephalic and atraumatic.   Mouth/Throat: No oropharyngeal exudate.   Neck: Normal range of motion. Neck supple. No thyromegaly present.   Cardiovascular: Normal rate, regular rhythm and normal heart sounds.   Pulmonary/Chest: Effort normal and breath sounds normal. No respiratory distress.   Abdominal: Soft. Bowel sounds are normal. She exhibits no distension. There is no tenderness.   Musculoskeletal: She exhibits no edema.   Lymphadenopathy:     She has no cervical adenopathy. "   Neurological: She is alert.   Skin: Skin is warm and dry.   Psychiatric: She has a normal mood and affect.   Nursing note and vitals reviewed.      Assessment:       1. Hyperlipidemia, unspecified hyperlipidemia type    2. Screen for colon cancer    3. Tobacco abuse    4. History of UTI    5. Vitamin D deficiency disease        Plan:   Verito was seen today for hyperlipidemia.    Diagnoses and all orders for this visit:    Hyperlipidemia, unspecified hyperlipidemia type  -     Lipid panel; Future  -     atorvastatin (LIPITOR) 20 MG tablet; TAKE 1 TABLET(20 MG) BY MOUTH EVERY DAY    Screen for colon cancer  -     Case request GI: COLONOSCOPY    Tobacco abuse    History of UTI    Vitamin D deficiency disease

## 2019-08-05 ENCOUNTER — PATIENT OUTREACH (OUTPATIENT)
Dept: ADMINISTRATIVE | Facility: HOSPITAL | Age: 69
End: 2019-08-05

## 2019-08-15 ENCOUNTER — LAB VISIT (OUTPATIENT)
Dept: LAB | Facility: HOSPITAL | Age: 69
End: 2019-08-15
Attending: FAMILY MEDICINE
Payer: MEDICARE

## 2019-08-15 DIAGNOSIS — E78.5 HYPERLIPIDEMIA, UNSPECIFIED HYPERLIPIDEMIA TYPE: ICD-10-CM

## 2019-08-15 LAB
CHOLEST SERPL-MCNC: 222 MG/DL (ref 120–199)
CHOLEST/HDLC SERPL: 5.6 {RATIO} (ref 2–5)
HDLC SERPL-MCNC: 40 MG/DL (ref 40–75)
HDLC SERPL: 18 % (ref 20–50)
LDLC SERPL CALC-MCNC: 163.4 MG/DL (ref 63–159)
NONHDLC SERPL-MCNC: 182 MG/DL
TRIGL SERPL-MCNC: 93 MG/DL (ref 30–150)

## 2019-08-15 PROCEDURE — 36415 COLL VENOUS BLD VENIPUNCTURE: CPT

## 2019-08-15 PROCEDURE — 80061 LIPID PANEL: CPT

## 2019-08-19 DIAGNOSIS — E78.5 HYPERLIPIDEMIA, UNSPECIFIED HYPERLIPIDEMIA TYPE: ICD-10-CM

## 2019-08-19 RX ORDER — ATORVASTATIN CALCIUM 20 MG/1
TABLET, FILM COATED ORAL
Qty: 90 TABLET | Refills: 3 | Status: SHIPPED | OUTPATIENT
Start: 2019-08-19 | End: 2020-02-13 | Stop reason: SDUPTHER

## 2019-08-19 NOTE — TELEPHONE ENCOUNTER
Please let her know that her cholesterol has improved some since previous check.  Please verify the dose and frequency of her Lipitor.  At her upcoming visit we can further discuss increasing the dose if she is willing.

## 2019-08-19 NOTE — TELEPHONE ENCOUNTER
Pt is take 20 mg of Lipitor, pt is taking Lipitor Once Daily.  Pt need a refill of her Lipitor, [facility].

## 2019-09-17 DIAGNOSIS — E78.5 HYPERLIPIDEMIA, UNSPECIFIED HYPERLIPIDEMIA TYPE: ICD-10-CM

## 2019-09-17 RX ORDER — ATORVASTATIN CALCIUM 20 MG/1
TABLET, FILM COATED ORAL
Qty: 90 TABLET | Refills: 0 | Status: SHIPPED | OUTPATIENT
Start: 2019-09-17 | End: 2019-12-15 | Stop reason: SDUPTHER

## 2019-12-15 DIAGNOSIS — E78.5 HYPERLIPIDEMIA, UNSPECIFIED HYPERLIPIDEMIA TYPE: ICD-10-CM

## 2019-12-16 RX ORDER — ATORVASTATIN CALCIUM 20 MG/1
TABLET, FILM COATED ORAL
Qty: 90 TABLET | Refills: 0 | Status: SHIPPED | OUTPATIENT
Start: 2019-12-16 | End: 2020-02-13 | Stop reason: SDUPTHER

## 2020-02-13 ENCOUNTER — CLINICAL SUPPORT (OUTPATIENT)
Dept: INTERNAL MEDICINE | Facility: CLINIC | Age: 70
End: 2020-02-13
Payer: MEDICARE

## 2020-02-13 ENCOUNTER — OFFICE VISIT (OUTPATIENT)
Dept: INTERNAL MEDICINE | Facility: CLINIC | Age: 70
End: 2020-02-13
Payer: MEDICARE

## 2020-02-13 VITALS
WEIGHT: 165.69 LBS | DIASTOLIC BLOOD PRESSURE: 74 MMHG | TEMPERATURE: 98 F | OXYGEN SATURATION: 99 % | HEART RATE: 85 BPM | HEIGHT: 69 IN | BODY MASS INDEX: 24.54 KG/M2 | SYSTOLIC BLOOD PRESSURE: 108 MMHG

## 2020-02-13 DIAGNOSIS — Z01.00 ROUTINE EYE EXAM: ICD-10-CM

## 2020-02-13 DIAGNOSIS — R73.03 PREDIABETES: ICD-10-CM

## 2020-02-13 DIAGNOSIS — E55.9 VITAMIN D DEFICIENCY DISEASE: ICD-10-CM

## 2020-02-13 DIAGNOSIS — Z12.11 SCREEN FOR COLON CANCER: ICD-10-CM

## 2020-02-13 DIAGNOSIS — Z78.0 POST-MENOPAUSAL: ICD-10-CM

## 2020-02-13 DIAGNOSIS — Z12.31 VISIT FOR SCREENING MAMMOGRAM: ICD-10-CM

## 2020-02-13 DIAGNOSIS — Z72.0 TOBACCO ABUSE: ICD-10-CM

## 2020-02-13 DIAGNOSIS — M85.80 OSTEOPENIA, UNSPECIFIED LOCATION: ICD-10-CM

## 2020-02-13 DIAGNOSIS — E78.5 HYPERLIPIDEMIA, UNSPECIFIED HYPERLIPIDEMIA TYPE: Primary | ICD-10-CM

## 2020-02-13 LAB
25(OH)D3+25(OH)D2 SERPL-MCNC: 21 NG/ML (ref 30–96)
ALBUMIN SERPL BCP-MCNC: 4.1 G/DL (ref 3.5–5.2)
ALP SERPL-CCNC: 90 U/L (ref 55–135)
ALT SERPL W/O P-5'-P-CCNC: 13 U/L (ref 10–44)
ANION GAP SERPL CALC-SCNC: 11 MMOL/L (ref 8–16)
AST SERPL-CCNC: 22 U/L (ref 10–40)
BILIRUB SERPL-MCNC: 0.3 MG/DL (ref 0.1–1)
BUN SERPL-MCNC: 12 MG/DL (ref 8–23)
CALCIUM SERPL-MCNC: 9.3 MG/DL (ref 8.7–10.5)
CHLORIDE SERPL-SCNC: 106 MMOL/L (ref 95–110)
CO2 SERPL-SCNC: 23 MMOL/L (ref 23–29)
CREAT SERPL-MCNC: 0.8 MG/DL (ref 0.5–1.4)
EST. GFR  (AFRICAN AMERICAN): >60 ML/MIN/1.73 M^2
EST. GFR  (NON AFRICAN AMERICAN): >60 ML/MIN/1.73 M^2
ESTIMATED AVG GLUCOSE: 117 MG/DL (ref 68–131)
GLUCOSE SERPL-MCNC: 80 MG/DL (ref 70–110)
HBA1C MFR BLD HPLC: 5.7 % (ref 4–5.6)
POTASSIUM SERPL-SCNC: 3.7 MMOL/L (ref 3.5–5.1)
PROT SERPL-MCNC: 8.3 G/DL (ref 6–8.4)
SODIUM SERPL-SCNC: 140 MMOL/L (ref 136–145)

## 2020-02-13 PROCEDURE — 1159F MED LIST DOCD IN RCRD: CPT | Mod: S$GLB,,, | Performed by: FAMILY MEDICINE

## 2020-02-13 PROCEDURE — 1101F PR PT FALLS ASSESS DOC 0-1 FALLS W/OUT INJ PAST YR: ICD-10-PCS | Mod: CPTII,S$GLB,, | Performed by: FAMILY MEDICINE

## 2020-02-13 PROCEDURE — 1101F PT FALLS ASSESS-DOCD LE1/YR: CPT | Mod: CPTII,S$GLB,, | Performed by: FAMILY MEDICINE

## 2020-02-13 PROCEDURE — 99999 PR PBB SHADOW E&M-EST. PATIENT-LVL IV: ICD-10-PCS | Mod: PBBFAC,,, | Performed by: FAMILY MEDICINE

## 2020-02-13 PROCEDURE — 99214 OFFICE O/P EST MOD 30 MIN: CPT | Mod: S$GLB,,, | Performed by: FAMILY MEDICINE

## 2020-02-13 PROCEDURE — 99214 PR OFFICE/OUTPT VISIT, EST, LEVL IV, 30-39 MIN: ICD-10-PCS | Mod: S$GLB,,, | Performed by: FAMILY MEDICINE

## 2020-02-13 PROCEDURE — 82306 VITAMIN D 25 HYDROXY: CPT

## 2020-02-13 PROCEDURE — 1159F PR MEDICATION LIST DOCUMENTED IN MEDICAL RECORD: ICD-10-PCS | Mod: S$GLB,,, | Performed by: FAMILY MEDICINE

## 2020-02-13 PROCEDURE — 1126F PR PAIN SEVERITY QUANTIFIED, NO PAIN PRESENT: ICD-10-PCS | Mod: S$GLB,,, | Performed by: FAMILY MEDICINE

## 2020-02-13 PROCEDURE — 1126F AMNT PAIN NOTED NONE PRSNT: CPT | Mod: S$GLB,,, | Performed by: FAMILY MEDICINE

## 2020-02-13 PROCEDURE — 83036 HEMOGLOBIN GLYCOSYLATED A1C: CPT

## 2020-02-13 PROCEDURE — 99999 PR PBB SHADOW E&M-EST. PATIENT-LVL IV: CPT | Mod: PBBFAC,,, | Performed by: FAMILY MEDICINE

## 2020-02-13 PROCEDURE — 80053 COMPREHEN METABOLIC PANEL: CPT

## 2020-02-13 RX ORDER — ATORVASTATIN CALCIUM 40 MG/1
40 TABLET, FILM COATED ORAL NIGHTLY
Qty: 90 TABLET | Refills: 1 | Status: SHIPPED | OUTPATIENT
Start: 2020-02-13 | End: 2020-08-13 | Stop reason: SDUPTHER

## 2020-02-13 NOTE — PROGRESS NOTES
"Subjective:       Patient ID: Verito Lamb is a 69 y.o. female.    Chief Complaint: Annual Exam    HPI 68 y/o current smoker with HLPD, Vit d def, Prediabetes, Osteopenia is here for follow up.      She is feeling good, denies f/n/v/d/constipation/cp/sob/urinary sx.  She is sleeping ok. Appetite good, weight up 7 pounds.  She is trying to stay active, not doing dedicated exercise.     Tobacco use: 1 pack every 3 days, CT lung cancer screen ordered  Prediabetes: a1c 5.6 5/2019  GYN: mmg utd 5/2019  Eye exam due  Vaccines:  Shingrix due  Never had colonoscopy, declined, willing to do stool test    Review of Systems   Constitutional: Negative for activity change, appetite change, fatigue and fever.   Respiratory: Negative for cough and shortness of breath.    Cardiovascular: Negative for chest pain, palpitations and leg swelling.   Gastrointestinal: Negative for constipation, diarrhea, nausea and vomiting.   Genitourinary: Negative for difficulty urinating and dysuria.   Skin: Negative for rash.   Neurological: Negative for dizziness and light-headedness.   Psychiatric/Behavioral: Negative for sleep disturbance.       Objective:      /74 (BP Location: Left arm, Patient Position: Sitting, BP Method: Medium (Manual))   Pulse 85   Temp 97.8 °F (36.6 °C)   Ht 5' 9" (1.753 m)   Wt 75.1 kg (165 lb 10.8 oz)   SpO2 99%   BMI 24.47 kg/m²   Physical Exam   Constitutional: She appears well-developed and well-nourished.   HENT:   Head: Normocephalic and atraumatic.   Mouth/Throat: No oropharyngeal exudate.   edentulous   Neck: Normal range of motion. Neck supple. No thyromegaly present.   Cardiovascular: Normal rate, regular rhythm and normal heart sounds.   Pulmonary/Chest: Effort normal and breath sounds normal. No respiratory distress.   Abdominal: Soft. Bowel sounds are normal. She exhibits no distension. There is no tenderness.   Musculoskeletal: She exhibits no edema.   Lymphadenopathy:     She has no cervical " adenopathy.   Neurological: She is alert.   Skin: Skin is warm and dry.   Psychiatric: She has a normal mood and affect.   Nursing note and vitals reviewed.      Assessment:       1. Hyperlipidemia, unspecified hyperlipidemia type    2. Osteopenia, unspecified location    3. Prediabetes    4. Tobacco abuse    5. Vitamin D deficiency disease    6. Post-menopausal    7. Visit for screening mammogram    8. Routine eye exam    9. Screen for colon cancer        Plan:   Verito was seen today for annual exam.    Diagnoses and all orders for this visit:    Hyperlipidemia, unspecified hyperlipidemia type  -     atorvastatin (LIPITOR) 40 MG tablet; Take 1 tablet (40 mg total) by mouth every evening. TAKE 1 TABLET(20 MG) BY MOUTH EVERY DAY  -     Comprehensive metabolic panel    Osteopenia, unspecified location  -     DXA Bone Density Spine And Hip; Future    Prediabetes  -     Comprehensive metabolic panel  -     Hemoglobin A1c    Tobacco abuse  -     Ambulatory referral/consult to Smoking Cessation Program; Future    Vitamin D deficiency disease  -     Vitamin D    Post-menopausal  -     DXA Bone Density Spine And Hip; Future    Visit for screening mammogram  -     Mammo Digital Screening Bilat w/ Herrera; Future    Routine eye exam  -     Ambulatory referral/consult to Optometry; Future    Screen for colon cancer  -     Cologuard Screening (Multitarget Stool DNA); Future  -     Cologuard Screening (Multitarget Stool DNA)

## 2020-02-17 ENCOUNTER — TELEPHONE (OUTPATIENT)
Dept: INTERNAL MEDICINE | Facility: CLINIC | Age: 70
End: 2020-02-17

## 2020-02-17 NOTE — TELEPHONE ENCOUNTER
Please let her know her vitamin-D level is too low.  I recommend she take over-the-counter vitamin D3 2000 international units daily.  Additionally, her blood sugar is in the prediabetic range.  I recommend she follow a healthy Mediterranean style diet and do 150 min of brisk activity per day.  Please offer to send her a copy.  Please have her follow up with me in 6 months.

## 2020-02-17 NOTE — TELEPHONE ENCOUNTER
Spoke with pt and informed her of her lab results. Informed pt her Vitamin D is too low. Informed pt to take over- the- counter vitamin D3 2000 international  units daily. Informed pt her blood sugar is in the prediabetic range. Recommended pt follow a healthy mediterranean style diet and do 150 min of brisk activity per week. Also informed pt to follow up with Dr Live in 6 months. Pt verbalized understanding.

## 2020-03-01 ENCOUNTER — PATIENT OUTREACH (OUTPATIENT)
Dept: ADMINISTRATIVE | Facility: OTHER | Age: 70
End: 2020-03-01

## 2020-03-01 ENCOUNTER — TELEPHONE (OUTPATIENT)
Dept: ADMINISTRATIVE | Facility: OTHER | Age: 70
End: 2020-03-01

## 2020-03-01 DIAGNOSIS — Z12.11 ENCOUNTER FOR FIT (FECAL IMMUNOCHEMICAL TEST) SCREENING: Primary | ICD-10-CM

## 2020-03-02 NOTE — PROGRESS NOTES
Chart reviewed.   Immunizations: updated  Orders placed: Fit kit  Upcoming appts to satisfy [facility] topics: n/a

## 2020-03-03 ENCOUNTER — OFFICE VISIT (OUTPATIENT)
Dept: OPTOMETRY | Facility: CLINIC | Age: 70
End: 2020-03-03
Payer: MEDICARE

## 2020-03-03 ENCOUNTER — HOSPITAL ENCOUNTER (OUTPATIENT)
Dept: RADIOLOGY | Facility: CLINIC | Age: 70
Discharge: HOME | End: 2020-03-03
Attending: FAMILY MEDICINE
Payer: MEDICARE

## 2020-03-03 DIAGNOSIS — Z78.0 POST-MENOPAUSAL: ICD-10-CM

## 2020-03-03 DIAGNOSIS — H52.13 MYOPIA WITH ASTIGMATISM AND PRESBYOPIA, BILATERAL: ICD-10-CM

## 2020-03-03 DIAGNOSIS — M85.80 OSTEOPENIA, UNSPECIFIED LOCATION: ICD-10-CM

## 2020-03-03 DIAGNOSIS — H52.4 MYOPIA WITH ASTIGMATISM AND PRESBYOPIA, BILATERAL: ICD-10-CM

## 2020-03-03 DIAGNOSIS — H52.203 MYOPIA WITH ASTIGMATISM AND PRESBYOPIA, BILATERAL: ICD-10-CM

## 2020-03-03 DIAGNOSIS — R73.03 PREDIABETES: Primary | ICD-10-CM

## 2020-03-03 PROCEDURE — 92015 DETERMINE REFRACTIVE STATE: CPT | Mod: S$GLB,,, | Performed by: OPTOMETRIST

## 2020-03-03 PROCEDURE — 77080 DEXA BONE DENSITY SPINE HIP: ICD-10-PCS | Mod: 26,,, | Performed by: INTERNAL MEDICINE

## 2020-03-03 PROCEDURE — 99999 PR PBB SHADOW E&M-EST. PATIENT-LVL II: CPT | Mod: PBBFAC,,, | Performed by: OPTOMETRIST

## 2020-03-03 PROCEDURE — 92004 COMPRE OPH EXAM NEW PT 1/>: CPT | Mod: S$GLB,,, | Performed by: OPTOMETRIST

## 2020-03-03 PROCEDURE — 92004 PR EYE EXAM, NEW PATIENT,COMPREHESV: ICD-10-PCS | Mod: S$GLB,,, | Performed by: OPTOMETRIST

## 2020-03-03 PROCEDURE — 99999 PR PBB SHADOW E&M-EST. PATIENT-LVL II: ICD-10-PCS | Mod: PBBFAC,,, | Performed by: OPTOMETRIST

## 2020-03-03 PROCEDURE — 77080 DXA BONE DENSITY AXIAL: CPT | Mod: TC

## 2020-03-03 PROCEDURE — 77080 DXA BONE DENSITY AXIAL: CPT | Mod: 26,,, | Performed by: INTERNAL MEDICINE

## 2020-03-03 PROCEDURE — 92015 PR REFRACTION: ICD-10-PCS | Mod: S$GLB,,, | Performed by: OPTOMETRIST

## 2020-03-03 NOTE — PROGRESS NOTES
HPI     Last eye exam was 6/3/16 with Dr. Gomez.  Patient states grandkids broke her glasses and needed to get an updated rx   to get a new pair. Hasn't noticed any vision changes with current glasses.  Patient denies diplopia, headaches, flashes/floaters, and pain.    Hemoglobin A1C       Date                     Value               Ref Range             Status                02/13/2020               5.7 (H)             4.0 - 5.6 %           Final                 Last edited by Josie Willoughby on 3/3/2020  9:25 AM. (History)            Assessment /Plan     For exam results, see Encounter Report.    Prediabetes  -     Ambulatory referral/consult to Optometry    Myopia with astigmatism and presbyopia, bilateral            1.  No retinopathy--monitor yearly.  Eye health normal OU.  2.  Bifocal rx given

## 2020-03-03 NOTE — LETTER
March 3, 2020      Sarita Live MD  [address]  [address]  [place] [address]           [facility]  [facility] [address]  Phone: [phone]  Fax: [phone]          Patient: Verito Lamb   MR Number: [id number]   YOB: 1950   Date of Visit: 3/3/2020       Dear Dr. Sarita Live:    Thank you for referring Verito Lamb to me for evaluation. Attached you will find relevant portions of my assessment and plan of care.    If you have questions, please do not hesitate to call me. I look forward to following Verito Lamb along with you.    Sincerely,    Luci Gomez, OD    Enclosure  CC:  No Recipients    If you would like to receive this communication electronically, please contact [email or web address] or [phone] to request more information on [employer] Link access.    For providers and/or their staff who would like to refer a patient to [facility], please contact us through our one-stop-shop provider referral line, [facility] , at [phone].    If you feel you have received this communication in error or would no longer like to receive these types of communications, please e-mail [email or web address]

## 2020-03-04 ENCOUNTER — TELEPHONE (OUTPATIENT)
Dept: INTERNAL MEDICINE | Facility: CLINIC | Age: 70
End: 2020-03-04

## 2020-03-04 DIAGNOSIS — R19.5 POSITIVE COLORECTAL CANCER SCREENING USING COLOGUARD TEST: ICD-10-CM

## 2020-03-04 DIAGNOSIS — Z12.11 SCREEN FOR COLON CANCER: Primary | ICD-10-CM

## 2020-03-05 ENCOUNTER — TELEPHONE (OUTPATIENT)
Dept: SMOKING CESSATION | Facility: CLINIC | Age: 70
End: 2020-03-05

## 2020-03-05 NOTE — TELEPHONE ENCOUNTER
Informed pt of dexa results. Pt verbalized understanding. Pt states that she will take 2000 international units of vit D3 daily, because she has been taking 2000 IU of vit D3 for a while now. Pt also states that she will start taking Ca.     Informed pt that her color guard is positive and that Dr. Live recommends that she gets a colonoscopy. Pt states that she will think about get a colonoscopy if it can be done on a Saturday. Pt states that if the colonoscopy shows she has cancer she does not want any radiation or treatment. Pt was given the number to schedule her colonoscopy. Pt has no further questions or concerns.

## 2020-03-05 NOTE — TELEPHONE ENCOUNTER
Please let her know her bone density shows osteopenia, this is a thinning of the bones that is a precursor to osteoporosis.  The best thing she can do for this is stop smoking.  Additionally, I recommend she take vitamin D3 a 1000 international units daily and an over-the-counter calcium supplement of a 1000 mg daily.    Please let her know that her color guard was positive, this means that blood was detected in her stool.  I recommend she have a colonoscopy.  I will go ahead and put in orders.

## 2020-03-05 NOTE — TELEPHONE ENCOUNTER
3/5/20    2:15 pm    Phone call to patient to reschedule initial intake appointment.  Patient said she forgot and we scheduled it for 3/12/20.

## 2020-03-11 ENCOUNTER — TELEPHONE (OUTPATIENT)
Dept: ENDOSCOPY | Facility: HOSPITAL | Age: 70
End: 2020-03-11

## 2020-03-11 NOTE — TELEPHONE ENCOUNTER
Called pt. To schedule Procedure. Pt. Stated due to the Corona virus, she does not want to come in.

## 2020-03-12 ENCOUNTER — TELEPHONE (OUTPATIENT)
Dept: SMOKING CESSATION | Facility: CLINIC | Age: 70
End: 2020-03-12

## 2020-03-12 NOTE — TELEPHONE ENCOUNTER
3/12/20   4:30 pm    Telephone call to patient to reschedule missed intake appointment.  Patient reported she missed the appointment because her girlfriend passed away this morning.  I will call her at another time to check in about rescheduling.

## 2020-03-13 ENCOUNTER — PATIENT OUTREACH (OUTPATIENT)
Dept: ADMINISTRATIVE | Facility: HOSPITAL | Age: 70
End: 2020-03-13

## 2020-03-27 ENCOUNTER — TELEPHONE (OUTPATIENT)
Dept: INTERNAL MEDICINE | Facility: CLINIC | Age: 70
End: 2020-03-27

## 2020-03-30 ENCOUNTER — TELEPHONE (OUTPATIENT)
Dept: SMOKING CESSATION | Facility: CLINIC | Age: 70
End: 2020-03-30

## 2020-03-30 NOTE — TELEPHONE ENCOUNTER
3/30/20   12:25 pm    Telephone call to follow up again with patient to see if she wanted to work on quitting smoking right now over the phone due to CoVid 19 precautions.  She said she just had 2 more friends die and one was from CoVid 19. She said she would give us a call when she is ready to quit smoking.

## 2020-07-10 ENCOUNTER — TELEPHONE (OUTPATIENT)
Dept: ENDOSCOPY | Facility: HOSPITAL | Age: 70
End: 2020-07-10

## 2020-07-28 ENCOUNTER — HOSPITAL ENCOUNTER (OUTPATIENT)
Dept: RADIOLOGY | Facility: HOSPITAL | Age: 70
Discharge: HOME | End: 2020-07-28
Attending: FAMILY MEDICINE
Payer: MEDICARE

## 2020-07-28 DIAGNOSIS — Z12.31 VISIT FOR SCREENING MAMMOGRAM: ICD-10-CM

## 2020-07-28 PROCEDURE — 77063 MAMMO DIGITAL SCREENING BILAT WITH TOMOSYNTHESIS_CAD: ICD-10-PCS | Mod: 26,,, | Performed by: RADIOLOGY

## 2020-07-28 PROCEDURE — 77067 SCR MAMMO BI INCL CAD: CPT | Mod: TC

## 2020-07-28 PROCEDURE — 77063 BREAST TOMOSYNTHESIS BI: CPT | Mod: 26,,, | Performed by: RADIOLOGY

## 2020-07-28 PROCEDURE — 77067 MAMMO DIGITAL SCREENING BILAT WITH TOMOSYNTHESIS_CAD: ICD-10-PCS | Mod: 26,,, | Performed by: RADIOLOGY

## 2020-07-28 PROCEDURE — 77067 SCR MAMMO BI INCL CAD: CPT | Mod: 26,,, | Performed by: RADIOLOGY

## 2020-07-29 ENCOUNTER — TELEPHONE (OUTPATIENT)
Dept: INTERNAL MEDICINE | Facility: CLINIC | Age: 70
End: 2020-07-29

## 2020-07-29 NOTE — TELEPHONE ENCOUNTER
Spoke with pt and informed he rthat her mammogram is normal and she can repeat in in 1 year. Pt verbalized understanding and declined a copy.

## 2020-07-29 NOTE — TELEPHONE ENCOUNTER
Please let her know that her mammogram was normal.  Please offer to send a copy.  We can repeat 1 year.

## 2020-08-13 ENCOUNTER — OFFICE VISIT (OUTPATIENT)
Dept: INTERNAL MEDICINE | Facility: CLINIC | Age: 70
End: 2020-08-13
Payer: MEDICARE

## 2020-08-13 VITALS
SYSTOLIC BLOOD PRESSURE: 120 MMHG | HEART RATE: 80 BPM | DIASTOLIC BLOOD PRESSURE: 78 MMHG | BODY MASS INDEX: 25.21 KG/M2 | HEIGHT: 69 IN | TEMPERATURE: 97 F | WEIGHT: 170.19 LBS | OXYGEN SATURATION: 99 %

## 2020-08-13 DIAGNOSIS — Z72.0 TOBACCO ABUSE: ICD-10-CM

## 2020-08-13 DIAGNOSIS — R73.03 PREDIABETES: ICD-10-CM

## 2020-08-13 DIAGNOSIS — E78.5 HYPERLIPIDEMIA, UNSPECIFIED HYPERLIPIDEMIA TYPE: Primary | ICD-10-CM

## 2020-08-13 DIAGNOSIS — E55.9 VITAMIN D DEFICIENCY DISEASE: ICD-10-CM

## 2020-08-13 DIAGNOSIS — R19.5 POSITIVE COLORECTAL CANCER SCREENING USING COLOGUARD TEST: ICD-10-CM

## 2020-08-13 PROCEDURE — 99499 UNLISTED E&M SERVICE: CPT | Mod: S$GLB,,, | Performed by: FAMILY MEDICINE

## 2020-08-13 PROCEDURE — 99499 RISK ADDL DX/OHS AUDIT: ICD-10-PCS | Mod: S$GLB,,, | Performed by: FAMILY MEDICINE

## 2020-08-13 PROCEDURE — 3008F PR BODY MASS INDEX (BMI) DOCUMENTED: ICD-10-PCS | Mod: CPTII,S$GLB,, | Performed by: FAMILY MEDICINE

## 2020-08-13 PROCEDURE — 99999 PR PBB SHADOW E&M-EST. PATIENT-LVL III: CPT | Mod: PBBFAC,,, | Performed by: FAMILY MEDICINE

## 2020-08-13 PROCEDURE — 1101F PT FALLS ASSESS-DOCD LE1/YR: CPT | Mod: CPTII,S$GLB,, | Performed by: FAMILY MEDICINE

## 2020-08-13 PROCEDURE — 99214 OFFICE O/P EST MOD 30 MIN: CPT | Mod: S$GLB,,, | Performed by: FAMILY MEDICINE

## 2020-08-13 PROCEDURE — 3008F BODY MASS INDEX DOCD: CPT | Mod: CPTII,S$GLB,, | Performed by: FAMILY MEDICINE

## 2020-08-13 PROCEDURE — 99999 PR PBB SHADOW E&M-EST. PATIENT-LVL III: ICD-10-PCS | Mod: PBBFAC,,, | Performed by: FAMILY MEDICINE

## 2020-08-13 PROCEDURE — 1159F MED LIST DOCD IN RCRD: CPT | Mod: S$GLB,,, | Performed by: FAMILY MEDICINE

## 2020-08-13 PROCEDURE — 1101F PR PT FALLS ASSESS DOC 0-1 FALLS W/OUT INJ PAST YR: ICD-10-PCS | Mod: CPTII,S$GLB,, | Performed by: FAMILY MEDICINE

## 2020-08-13 PROCEDURE — 1126F AMNT PAIN NOTED NONE PRSNT: CPT | Mod: S$GLB,,, | Performed by: FAMILY MEDICINE

## 2020-08-13 PROCEDURE — 1159F PR MEDICATION LIST DOCUMENTED IN MEDICAL RECORD: ICD-10-PCS | Mod: S$GLB,,, | Performed by: FAMILY MEDICINE

## 2020-08-13 PROCEDURE — 1126F PR PAIN SEVERITY QUANTIFIED, NO PAIN PRESENT: ICD-10-PCS | Mod: S$GLB,,, | Performed by: FAMILY MEDICINE

## 2020-08-13 PROCEDURE — 99214 PR OFFICE/OUTPT VISIT, EST, LEVL IV, 30-39 MIN: ICD-10-PCS | Mod: S$GLB,,, | Performed by: FAMILY MEDICINE

## 2020-08-13 RX ORDER — ATORVASTATIN CALCIUM 40 MG/1
40 TABLET, FILM COATED ORAL NIGHTLY
Qty: 90 TABLET | Refills: 3 | Status: SHIPPED | OUTPATIENT
Start: 2020-08-13 | End: 2020-09-03 | Stop reason: SDUPTHER

## 2020-08-13 NOTE — PROGRESS NOTES
"Subjective:       Patient ID: Verito Lamb is a 69 y.o. female.    Chief Complaint: 6 month follow-up    HPI  68 y/o current smoker with HLPD, Vit d def, Prediabetes, Osteopenia is here for follow up.     She is feeling good in general, still smoking, denies f/n/v/d/constipation/cp/sob/urinary sx.  She is sleeping ok. Appetite good, weight up 5 pounds. She is eating a lot of chocolate and hawian bread.  She is trying to stay active, not doing dedicated exercise. Mood good.     Tobacco use: 1 pack every 3 days, 20 pack years  Prediabetes: a1c 5.7 2/2020  GYN: mmg 7/2020  Osteopenia: 3/2020  cologuard positive: plans for colonoscopy  Eye exam utd  Vaccines:  Shingrix due    Review of Systems  see HPI  Objective:      /78 (BP Location: Right arm, Patient Position: Sitting, BP Method: Medium (Manual))   Pulse 80   Temp 97.3 °F (36.3 °C)   Ht 5' 9" (1.753 m)   Wt 77.2 kg (170 lb 3.1 oz)   SpO2 99%   BMI 25.13 kg/m²   Physical Exam  Vitals signs and nursing note reviewed.   Constitutional:       Appearance: She is well-developed.   HENT:      Head: Normocephalic and atraumatic.      Mouth/Throat:      Pharynx: No oropharyngeal exudate.   Neck:      Musculoskeletal: Normal range of motion and neck supple.      Thyroid: No thyromegaly.   Cardiovascular:      Rate and Rhythm: Normal rate and regular rhythm.      Heart sounds: Normal heart sounds.   Pulmonary:      Effort: Pulmonary effort is normal. No respiratory distress.      Breath sounds: Normal breath sounds.   Lymphadenopathy:      Cervical: No cervical adenopathy.   Skin:     General: Skin is warm and dry.   Neurological:      Mental Status: She is alert.         Assessment:       1. Hyperlipidemia, unspecified hyperlipidemia type    2. Positive colorectal cancer screening using Cologuard test    3. Prediabetes    4. Tobacco abuse    5. Vitamin D deficiency disease        Plan:   Verito was seen today for 6 month follow-up.    Diagnoses and all orders " for this visit:    Hyperlipidemia, unspecified hyperlipidemia type  -     atorvastatin (LIPITOR) 40 MG tablet; Take 1 tablet (40 mg total) by mouth every evening. TAKE 1 TABLET(20 MG) BY MOUTH EVERY DAY  -     CBC auto differential; Future  -     TSH; Future  -     Lipid Panel; Future    Positive colorectal cancer screening using Cologuard test    Prediabetes  -     Hemoglobin A1C; Future  -     CBC auto differential; Future  -     TSH; Future    Tobacco abuse    Vitamin D deficiency disease  -     Vitamin D; Future

## 2020-09-01 ENCOUNTER — LAB VISIT (OUTPATIENT)
Dept: INTERNAL MEDICINE | Facility: CLINIC | Age: 70
End: 2020-09-01
Payer: MEDICARE

## 2020-09-01 DIAGNOSIS — E78.5 HYPERLIPIDEMIA, UNSPECIFIED HYPERLIPIDEMIA TYPE: ICD-10-CM

## 2020-09-01 DIAGNOSIS — E55.9 VITAMIN D DEFICIENCY DISEASE: ICD-10-CM

## 2020-09-01 DIAGNOSIS — R73.03 PREDIABETES: ICD-10-CM

## 2020-09-01 LAB
BASOPHILS # BLD AUTO: 0.02 K/UL (ref 0–0.2)
BASOPHILS NFR BLD: 0.4 % (ref 0–1.9)
CHOLEST SERPL-MCNC: 240 MG/DL (ref 120–199)
CHOLEST/HDLC SERPL: 5.9 {RATIO} (ref 2–5)
DIFFERENTIAL METHOD: ABNORMAL
EOSINOPHIL # BLD AUTO: 0 K/UL (ref 0–0.5)
EOSINOPHIL NFR BLD: 0 % (ref 0–8)
ERYTHROCYTE [DISTWIDTH] IN BLOOD BY AUTOMATED COUNT: 14.8 % (ref 11.5–14.5)
ESTIMATED AVG GLUCOSE: 117 MG/DL (ref 68–131)
HBA1C MFR BLD HPLC: 5.7 % (ref 4–5.6)
HCT VFR BLD AUTO: 40.8 % (ref 37–48.5)
HDLC SERPL-MCNC: 41 MG/DL (ref 40–75)
HDLC SERPL: 17.1 % (ref 20–50)
HGB BLD-MCNC: 13.3 G/DL (ref 12–16)
IMM GRANULOCYTES # BLD AUTO: 0.02 K/UL (ref 0–0.04)
IMM GRANULOCYTES NFR BLD AUTO: 0.4 % (ref 0–0.5)
LDLC SERPL CALC-MCNC: 177.6 MG/DL (ref 63–159)
LYMPHOCYTES # BLD AUTO: 2 K/UL (ref 1–4.8)
LYMPHOCYTES NFR BLD: 37.4 % (ref 18–48)
MCH RBC QN AUTO: 29.6 PG (ref 27–31)
MCHC RBC AUTO-ENTMCNC: 32.6 G/DL (ref 32–36)
MCV RBC AUTO: 91 FL (ref 82–98)
MONOCYTES # BLD AUTO: 0.4 K/UL (ref 0.3–1)
MONOCYTES NFR BLD: 8 % (ref 4–15)
NEUTROPHILS # BLD AUTO: 2.9 K/UL (ref 1.8–7.7)
NEUTROPHILS NFR BLD: 53.8 % (ref 38–73)
NONHDLC SERPL-MCNC: 199 MG/DL
NRBC BLD-RTO: 0 /100 WBC
PLATELET # BLD AUTO: 345 K/UL (ref 150–350)
PMV BLD AUTO: 9.8 FL (ref 9.2–12.9)
RBC # BLD AUTO: 4.5 M/UL (ref 4–5.4)
TRIGL SERPL-MCNC: 107 MG/DL (ref 30–150)
TSH SERPL DL<=0.005 MIU/L-ACNC: 1.22 UIU/ML (ref 0.4–4)
WBC # BLD AUTO: 5.4 K/UL (ref 3.9–12.7)

## 2020-09-01 PROCEDURE — 83036 HEMOGLOBIN GLYCOSYLATED A1C: CPT

## 2020-09-01 PROCEDURE — 82306 VITAMIN D 25 HYDROXY: CPT

## 2020-09-01 PROCEDURE — 84443 ASSAY THYROID STIM HORMONE: CPT

## 2020-09-01 PROCEDURE — 36415 COLL VENOUS BLD VENIPUNCTURE: CPT

## 2020-09-01 PROCEDURE — 80061 LIPID PANEL: CPT

## 2020-09-01 PROCEDURE — 85025 COMPLETE CBC W/AUTO DIFF WBC: CPT

## 2020-09-02 LAB — 25(OH)D3+25(OH)D2 SERPL-MCNC: 35 NG/ML (ref 30–96)

## 2020-09-03 ENCOUNTER — TELEPHONE (OUTPATIENT)
Dept: INTERNAL MEDICINE | Facility: CLINIC | Age: 70
End: 2020-09-03

## 2020-09-03 DIAGNOSIS — E78.5 HYPERLIPIDEMIA, UNSPECIFIED HYPERLIPIDEMIA TYPE: ICD-10-CM

## 2020-09-03 RX ORDER — ATORVASTATIN CALCIUM 80 MG/1
80 TABLET, FILM COATED ORAL NIGHTLY
Qty: 90 TABLET | Refills: 1 | Status: SHIPPED | OUTPATIENT
Start: 2020-09-03 | End: 2021-08-04 | Stop reason: SDUPTHER

## 2020-09-03 NOTE — TELEPHONE ENCOUNTER
Please her know that her cholesterol has increased since our previous check.  I recommend we increase her Lipitor to 80 mg nightly.  She can start doubling up on the 40 mg until they run out and then  the new script for the 80 mg tablet.  I recommend we repeat her labs in 8 weeks.  Please offer to mail her a copy of the labs.

## 2020-09-03 NOTE — TELEPHONE ENCOUNTER
Spoke with pt and informed her that her cholesterol has increased a little since her previous check. Informed the pt to increase her Lipitor to 80 mg nightly. Informed the pt to double up on the 40 mg and when she runs out she can  the new script. Pt is scheduled for repeat labs in 8 weeks. Pt verbalized understanding.

## 2021-02-08 ENCOUNTER — TELEPHONE (OUTPATIENT)
Dept: INTERNAL MEDICINE | Facility: CLINIC | Age: 71
End: 2021-02-08

## 2021-02-09 ENCOUNTER — OFFICE VISIT (OUTPATIENT)
Dept: INTERNAL MEDICINE | Facility: CLINIC | Age: 71
End: 2021-02-09
Payer: MEDICARE

## 2021-02-09 ENCOUNTER — LAB VISIT (OUTPATIENT)
Dept: INTERNAL MEDICINE | Facility: CLINIC | Age: 71
End: 2021-02-09
Payer: MEDICARE

## 2021-02-09 VITALS
TEMPERATURE: 98 F | OXYGEN SATURATION: 99 % | HEIGHT: 69 IN | WEIGHT: 170.19 LBS | DIASTOLIC BLOOD PRESSURE: 70 MMHG | BODY MASS INDEX: 25.21 KG/M2 | HEART RATE: 83 BPM | SYSTOLIC BLOOD PRESSURE: 100 MMHG

## 2021-02-09 DIAGNOSIS — K59.00 CONSTIPATION, UNSPECIFIED CONSTIPATION TYPE: ICD-10-CM

## 2021-02-09 DIAGNOSIS — R35.0 FREQUENCY OF URINATION: ICD-10-CM

## 2021-02-09 DIAGNOSIS — Z72.0 TOBACCO ABUSE: ICD-10-CM

## 2021-02-09 DIAGNOSIS — Z12.11 SCREEN FOR COLON CANCER: ICD-10-CM

## 2021-02-09 DIAGNOSIS — R19.8 ABDOMINAL FULLNESS IN SUPRAPUBIC REGION: Primary | ICD-10-CM

## 2021-02-09 DIAGNOSIS — R19.5 POSITIVE COLORECTAL CANCER SCREENING USING COLOGUARD TEST: ICD-10-CM

## 2021-02-09 DIAGNOSIS — E78.5 HYPERLIPIDEMIA, UNSPECIFIED HYPERLIPIDEMIA TYPE: ICD-10-CM

## 2021-02-09 DIAGNOSIS — N30.01 ACUTE CYSTITIS WITH HEMATURIA: ICD-10-CM

## 2021-02-09 DIAGNOSIS — R14.0 ABDOMINAL DISTENSION (GASEOUS): ICD-10-CM

## 2021-02-09 DIAGNOSIS — R73.03 PREDIABETES: ICD-10-CM

## 2021-02-09 LAB
ALBUMIN SERPL BCP-MCNC: 3.9 G/DL (ref 3.5–5.2)
ALP SERPL-CCNC: 92 U/L (ref 55–135)
ALT SERPL W/O P-5'-P-CCNC: 13 U/L (ref 10–44)
ANION GAP SERPL CALC-SCNC: 12 MMOL/L (ref 8–16)
AST SERPL-CCNC: 17 U/L (ref 10–40)
BILIRUB SERPL-MCNC: 0.3 MG/DL (ref 0.1–1)
BILIRUB SERPL-MCNC: ABNORMAL MG/DL
BLOOD URINE, POC: 50
BUN SERPL-MCNC: 12 MG/DL (ref 8–23)
CALCIUM SERPL-MCNC: 9 MG/DL (ref 8.7–10.5)
CHLORIDE SERPL-SCNC: 105 MMOL/L (ref 95–110)
CLARITY, POC UA: CLEAR
CO2 SERPL-SCNC: 23 MMOL/L (ref 23–29)
COLOR, POC UA: YELLOW
CREAT SERPL-MCNC: 0.8 MG/DL (ref 0.5–1.4)
EST. GFR  (AFRICAN AMERICAN): >60 ML/MIN/1.73 M^2
EST. GFR  (NON AFRICAN AMERICAN): >60 ML/MIN/1.73 M^2
ESTIMATED AVG GLUCOSE: 114 MG/DL (ref 68–131)
GLUCOSE SERPL-MCNC: 82 MG/DL (ref 70–110)
GLUCOSE UR QL STRIP: NORMAL
HBA1C MFR BLD: 5.6 % (ref 4–5.6)
KETONES UR QL STRIP: ABNORMAL
LEUKOCYTE ESTERASE URINE, POC: ABNORMAL
NITRITE, POC UA: ABNORMAL
PH, POC UA: 7
POTASSIUM SERPL-SCNC: 3.9 MMOL/L (ref 3.5–5.1)
PROT SERPL-MCNC: 8.1 G/DL (ref 6–8.4)
PROTEIN, POC: ABNORMAL
SODIUM SERPL-SCNC: 140 MMOL/L (ref 136–145)
SPECIFIC GRAVITY, POC UA: 1
UROBILINOGEN, POC UA: NORMAL

## 2021-02-09 PROCEDURE — 3008F PR BODY MASS INDEX (BMI) DOCUMENTED: ICD-10-PCS | Mod: CPTII,S$GLB,, | Performed by: FAMILY MEDICINE

## 2021-02-09 PROCEDURE — 83036 HEMOGLOBIN GLYCOSYLATED A1C: CPT

## 2021-02-09 PROCEDURE — 99999 PR PBB SHADOW E&M-EST. PATIENT-LVL III: ICD-10-PCS | Mod: PBBFAC,,, | Performed by: FAMILY MEDICINE

## 2021-02-09 PROCEDURE — 99999 PR PBB SHADOW E&M-EST. PATIENT-LVL III: CPT | Mod: PBBFAC,,, | Performed by: FAMILY MEDICINE

## 2021-02-09 PROCEDURE — 1101F PT FALLS ASSESS-DOCD LE1/YR: CPT | Mod: CPTII,S$GLB,, | Performed by: FAMILY MEDICINE

## 2021-02-09 PROCEDURE — 99214 OFFICE O/P EST MOD 30 MIN: CPT | Mod: 25,S$GLB,, | Performed by: FAMILY MEDICINE

## 2021-02-09 PROCEDURE — 81002 POCT URINE DIPSTICK WITHOUT MICROSCOPE: ICD-10-PCS | Mod: S$GLB,,, | Performed by: FAMILY MEDICINE

## 2021-02-09 PROCEDURE — 1126F AMNT PAIN NOTED NONE PRSNT: CPT | Mod: S$GLB,,, | Performed by: FAMILY MEDICINE

## 2021-02-09 PROCEDURE — 1101F PR PT FALLS ASSESS DOC 0-1 FALLS W/OUT INJ PAST YR: ICD-10-PCS | Mod: CPTII,S$GLB,, | Performed by: FAMILY MEDICINE

## 2021-02-09 PROCEDURE — 99214 PR OFFICE/OUTPT VISIT, EST, LEVL IV, 30-39 MIN: ICD-10-PCS | Mod: 25,S$GLB,, | Performed by: FAMILY MEDICINE

## 2021-02-09 PROCEDURE — 81002 URINALYSIS NONAUTO W/O SCOPE: CPT | Mod: S$GLB,,, | Performed by: FAMILY MEDICINE

## 2021-02-09 PROCEDURE — 1159F MED LIST DOCD IN RCRD: CPT | Mod: S$GLB,,, | Performed by: FAMILY MEDICINE

## 2021-02-09 PROCEDURE — 3288F FALL RISK ASSESSMENT DOCD: CPT | Mod: CPTII,S$GLB,, | Performed by: FAMILY MEDICINE

## 2021-02-09 PROCEDURE — 1159F PR MEDICATION LIST DOCUMENTED IN MEDICAL RECORD: ICD-10-PCS | Mod: S$GLB,,, | Performed by: FAMILY MEDICINE

## 2021-02-09 PROCEDURE — 3008F BODY MASS INDEX DOCD: CPT | Mod: CPTII,S$GLB,, | Performed by: FAMILY MEDICINE

## 2021-02-09 PROCEDURE — 3288F PR FALLS RISK ASSESSMENT DOCUMENTED: ICD-10-PCS | Mod: CPTII,S$GLB,, | Performed by: FAMILY MEDICINE

## 2021-02-09 PROCEDURE — 1126F PR PAIN SEVERITY QUANTIFIED, NO PAIN PRESENT: ICD-10-PCS | Mod: S$GLB,,, | Performed by: FAMILY MEDICINE

## 2021-02-09 PROCEDURE — 80053 COMPREHEN METABOLIC PANEL: CPT

## 2021-02-09 RX ORDER — POLYETHYLENE GLYCOL 3350 17 G/17G
17 POWDER, FOR SOLUTION ORAL DAILY
Qty: 850 G | Refills: 0 | Status: SHIPPED | OUTPATIENT
Start: 2021-02-09 | End: 2021-03-31

## 2021-02-09 RX ORDER — NITROFURANTOIN (MACROCRYSTALS) 100 MG/1
100 CAPSULE ORAL EVERY 12 HOURS
Qty: 14 CAPSULE | Refills: 0 | Status: SHIPPED | OUTPATIENT
Start: 2021-02-09 | End: 2021-02-16

## 2021-02-10 ENCOUNTER — IMMUNIZATION (OUTPATIENT)
Dept: PRIMARY CARE CLINIC | Facility: CLINIC | Age: 71
End: 2021-02-10
Payer: MEDICARE

## 2021-02-10 ENCOUNTER — TELEPHONE (OUTPATIENT)
Dept: INTERNAL MEDICINE | Facility: CLINIC | Age: 71
End: 2021-02-10

## 2021-02-10 DIAGNOSIS — Z23 NEED FOR VACCINATION: Primary | ICD-10-CM

## 2021-02-10 PROCEDURE — 0001A PR IMMUNIZ ADMIN, SARS-COV-2 COVID-19 VACC, 30MCG/0.3ML, 1ST DOSE: CPT | Mod: CV19,S$GLB,, | Performed by: INTERNAL MEDICINE

## 2021-02-10 PROCEDURE — 91300 PR SARS-COV- 2 COVID-19 VACCINE, NO PRSV, 30MCG/0.3ML, IM: ICD-10-PCS | Mod: S$GLB,,, | Performed by: INTERNAL MEDICINE

## 2021-02-10 PROCEDURE — 91300 PR SARS-COV- 2 COVID-19 VACCINE, NO PRSV, 30MCG/0.3ML, IM: CPT | Mod: S$GLB,,, | Performed by: INTERNAL MEDICINE

## 2021-02-10 PROCEDURE — 0001A PR IMMUNIZ ADMIN, SARS-COV-2 COVID-19 VACC, 30MCG/0.3ML, 1ST DOSE: ICD-10-PCS | Mod: CV19,S$GLB,, | Performed by: INTERNAL MEDICINE

## 2021-02-10 RX ADMIN — Medication 0.3 ML: at 02:02

## 2021-02-16 ENCOUNTER — TELEPHONE (OUTPATIENT)
Dept: INTERNAL MEDICINE | Facility: CLINIC | Age: 71
End: 2021-02-16

## 2021-02-26 ENCOUNTER — HOSPITAL ENCOUNTER (OUTPATIENT)
Dept: RADIOLOGY | Facility: HOSPITAL | Age: 71
Discharge: HOME | End: 2021-02-26
Attending: FAMILY MEDICINE
Payer: MEDICARE

## 2021-02-26 DIAGNOSIS — R35.0 FREQUENCY OF URINATION: ICD-10-CM

## 2021-02-26 DIAGNOSIS — R14.0 ABDOMINAL DISTENSION (GASEOUS): ICD-10-CM

## 2021-02-26 DIAGNOSIS — R19.8 ABDOMINAL FULLNESS IN SUPRAPUBIC REGION: ICD-10-CM

## 2021-02-26 PROCEDURE — 25500020 PHARM REV CODE 255: Performed by: FAMILY MEDICINE

## 2021-02-26 PROCEDURE — 74176 CT ABD & PELVIS W/O CONTRAST: CPT | Mod: 26,,, | Performed by: RADIOLOGY

## 2021-02-26 PROCEDURE — 74176 CT ABDOMEN PELVIS WITHOUT CONTRAST: ICD-10-PCS | Mod: 26,,, | Performed by: RADIOLOGY

## 2021-02-26 PROCEDURE — 74176 CT ABD & PELVIS W/O CONTRAST: CPT | Mod: TC

## 2021-02-26 RX ADMIN — IOHEXOL 15 ML: 350 INJECTION, SOLUTION INTRAVENOUS at 03:02

## 2021-02-26 RX ADMIN — IOHEXOL 15 ML: 350 INJECTION, SOLUTION INTRAVENOUS at 02:02

## 2021-03-01 ENCOUNTER — TELEPHONE (OUTPATIENT)
Dept: INTERNAL MEDICINE | Facility: CLINIC | Age: 71
End: 2021-03-01

## 2021-03-01 DIAGNOSIS — N94.89 MASS OF UTERINE ADNEXA: Primary | ICD-10-CM

## 2021-03-01 DIAGNOSIS — N13.30 HYDRONEPHROSIS, UNSPECIFIED HYDRONEPHROSIS TYPE: ICD-10-CM

## 2021-03-03 ENCOUNTER — IMMUNIZATION (OUTPATIENT)
Dept: PRIMARY CARE CLINIC | Facility: CLINIC | Age: 71
End: 2021-03-03
Payer: MEDICARE

## 2021-03-03 ENCOUNTER — TELEPHONE (OUTPATIENT)
Dept: INTERNAL MEDICINE | Facility: CLINIC | Age: 71
End: 2021-03-03

## 2021-03-03 DIAGNOSIS — Z23 NEED FOR VACCINATION: Primary | ICD-10-CM

## 2021-03-03 PROCEDURE — 0002A PR IMMUNIZ ADMIN, SARS-COV-2 COVID-19 VACC, 30MCG/0.3ML, 2ND DOSE: ICD-10-PCS | Mod: CV19,S$GLB,, | Performed by: INTERNAL MEDICINE

## 2021-03-03 PROCEDURE — 91300 PR SARS-COV- 2 COVID-19 VACCINE, NO PRSV, 30MCG/0.3ML, IM: ICD-10-PCS | Mod: S$GLB,,, | Performed by: INTERNAL MEDICINE

## 2021-03-03 PROCEDURE — 0002A PR IMMUNIZ ADMIN, SARS-COV-2 COVID-19 VACC, 30MCG/0.3ML, 2ND DOSE: CPT | Mod: CV19,S$GLB,, | Performed by: INTERNAL MEDICINE

## 2021-03-03 PROCEDURE — 91300 PR SARS-COV- 2 COVID-19 VACCINE, NO PRSV, 30MCG/0.3ML, IM: CPT | Mod: S$GLB,,, | Performed by: INTERNAL MEDICINE

## 2021-03-03 RX ADMIN — Medication 0.3 ML: at 02:03

## 2021-03-19 ENCOUNTER — TELEPHONE (OUTPATIENT)
Dept: GYNECOLOGIC ONCOLOGY | Facility: CLINIC | Age: 71
End: 2021-03-19

## 2021-03-30 ENCOUNTER — TELEPHONE (OUTPATIENT)
Dept: ENDOSCOPY | Facility: HOSPITAL | Age: 71
End: 2021-03-30

## 2021-03-31 ENCOUNTER — OFFICE VISIT (OUTPATIENT)
Dept: GYNECOLOGIC ONCOLOGY | Facility: CLINIC | Age: 71
End: 2021-03-31
Payer: MEDICARE

## 2021-03-31 ENCOUNTER — TELEPHONE (OUTPATIENT)
Dept: GYNECOLOGIC ONCOLOGY | Facility: CLINIC | Age: 71
End: 2021-03-31

## 2021-03-31 VITALS
WEIGHT: 172.88 LBS | BODY MASS INDEX: 25.61 KG/M2 | SYSTOLIC BLOOD PRESSURE: 117 MMHG | DIASTOLIC BLOOD PRESSURE: 58 MMHG | HEIGHT: 69 IN | HEART RATE: 84 BPM

## 2021-03-31 DIAGNOSIS — N94.89 MASS OF UTERINE ADNEXA: ICD-10-CM

## 2021-03-31 DIAGNOSIS — Z01.818 PRE-OP TESTING: Primary | ICD-10-CM

## 2021-03-31 DIAGNOSIS — N13.30 HYDRONEPHROSIS, UNSPECIFIED HYDRONEPHROSIS TYPE: ICD-10-CM

## 2021-03-31 PROCEDURE — 1159F MED LIST DOCD IN RCRD: CPT | Mod: S$GLB,,, | Performed by: OBSTETRICS & GYNECOLOGY

## 2021-03-31 PROCEDURE — 99999 PR PBB SHADOW E&M-EST. PATIENT-LVL III: ICD-10-PCS | Mod: PBBFAC,,, | Performed by: OBSTETRICS & GYNECOLOGY

## 2021-03-31 PROCEDURE — 3288F FALL RISK ASSESSMENT DOCD: CPT | Mod: CPTII,S$GLB,, | Performed by: OBSTETRICS & GYNECOLOGY

## 2021-03-31 PROCEDURE — 1126F PR PAIN SEVERITY QUANTIFIED, NO PAIN PRESENT: ICD-10-PCS | Mod: S$GLB,,, | Performed by: OBSTETRICS & GYNECOLOGY

## 2021-03-31 PROCEDURE — 99205 OFFICE O/P NEW HI 60 MIN: CPT | Mod: S$GLB,,, | Performed by: OBSTETRICS & GYNECOLOGY

## 2021-03-31 PROCEDURE — 99205 PR OFFICE/OUTPT VISIT, NEW, LEVL V, 60-74 MIN: ICD-10-PCS | Mod: S$GLB,,, | Performed by: OBSTETRICS & GYNECOLOGY

## 2021-03-31 PROCEDURE — 3008F BODY MASS INDEX DOCD: CPT | Mod: CPTII,S$GLB,, | Performed by: OBSTETRICS & GYNECOLOGY

## 2021-03-31 PROCEDURE — 1101F PT FALLS ASSESS-DOCD LE1/YR: CPT | Mod: CPTII,S$GLB,, | Performed by: OBSTETRICS & GYNECOLOGY

## 2021-03-31 PROCEDURE — 3008F PR BODY MASS INDEX (BMI) DOCUMENTED: ICD-10-PCS | Mod: CPTII,S$GLB,, | Performed by: OBSTETRICS & GYNECOLOGY

## 2021-03-31 PROCEDURE — 99999 PR PBB SHADOW E&M-EST. PATIENT-LVL III: CPT | Mod: PBBFAC,,, | Performed by: OBSTETRICS & GYNECOLOGY

## 2021-03-31 PROCEDURE — 1126F AMNT PAIN NOTED NONE PRSNT: CPT | Mod: S$GLB,,, | Performed by: OBSTETRICS & GYNECOLOGY

## 2021-03-31 PROCEDURE — 1159F PR MEDICATION LIST DOCUMENTED IN MEDICAL RECORD: ICD-10-PCS | Mod: S$GLB,,, | Performed by: OBSTETRICS & GYNECOLOGY

## 2021-03-31 PROCEDURE — 1101F PR PT FALLS ASSESS DOC 0-1 FALLS W/OUT INJ PAST YR: ICD-10-PCS | Mod: CPTII,S$GLB,, | Performed by: OBSTETRICS & GYNECOLOGY

## 2021-03-31 PROCEDURE — 3288F PR FALLS RISK ASSESSMENT DOCUMENTED: ICD-10-PCS | Mod: CPTII,S$GLB,, | Performed by: OBSTETRICS & GYNECOLOGY

## 2021-04-01 ENCOUNTER — TELEPHONE (OUTPATIENT)
Dept: GYNECOLOGIC ONCOLOGY | Facility: CLINIC | Age: 71
End: 2021-04-01

## 2021-04-26 ENCOUNTER — HOSPITAL ENCOUNTER (OUTPATIENT)
Dept: PREADMISSION TESTING | Facility: OTHER | Age: 71
Discharge: HOME | End: 2021-04-26
Attending: OBSTETRICS & GYNECOLOGY
Payer: MEDICARE

## 2021-04-26 ENCOUNTER — ANESTHESIA EVENT (OUTPATIENT)
Dept: SURGERY | Facility: OTHER | Age: 71
DRG: 742 | End: 2021-04-26
Payer: MEDICARE

## 2021-04-26 VITALS
RESPIRATION RATE: 16 BRPM | HEART RATE: 78 BPM | WEIGHT: 172 LBS | TEMPERATURE: 98 F | BODY MASS INDEX: 25.48 KG/M2 | SYSTOLIC BLOOD PRESSURE: 125 MMHG | HEIGHT: 69 IN | OXYGEN SATURATION: 97 % | DIASTOLIC BLOOD PRESSURE: 60 MMHG

## 2021-04-26 DIAGNOSIS — Z01.818 PREOP TESTING: Primary | ICD-10-CM

## 2021-04-26 LAB
BASOPHILS # BLD AUTO: 0.02 K/UL (ref 0–0.2)
BASOPHILS NFR BLD: 0.3 % (ref 0–1.9)
DIFFERENTIAL METHOD: ABNORMAL
EOSINOPHIL # BLD AUTO: 0 K/UL (ref 0–0.5)
EOSINOPHIL NFR BLD: 0 % (ref 0–8)
ERYTHROCYTE [DISTWIDTH] IN BLOOD BY AUTOMATED COUNT: 14.4 % (ref 11.5–14.5)
HCT VFR BLD AUTO: 43.5 % (ref 37–48.5)
HGB BLD-MCNC: 14.4 G/DL (ref 12–16)
IMM GRANULOCYTES # BLD AUTO: 0.01 K/UL (ref 0–0.04)
IMM GRANULOCYTES NFR BLD AUTO: 0.1 % (ref 0–0.5)
LYMPHOCYTES # BLD AUTO: 2.3 K/UL (ref 1–4.8)
LYMPHOCYTES NFR BLD: 30.4 % (ref 18–48)
MCH RBC QN AUTO: 29.1 PG (ref 27–31)
MCHC RBC AUTO-ENTMCNC: 33.1 G/DL (ref 32–36)
MCV RBC AUTO: 88 FL (ref 82–98)
MONOCYTES # BLD AUTO: 0.5 K/UL (ref 0.3–1)
MONOCYTES NFR BLD: 6.3 % (ref 4–15)
NEUTROPHILS # BLD AUTO: 4.8 K/UL (ref 1.8–7.7)
NEUTROPHILS NFR BLD: 62.9 % (ref 38–73)
NRBC BLD-RTO: 0 /100 WBC
PLATELET # BLD AUTO: 386 K/UL (ref 150–450)
PMV BLD AUTO: 8.3 FL (ref 9.2–12.9)
RBC # BLD AUTO: 4.95 M/UL (ref 4–5.4)
WBC # BLD AUTO: 7.59 K/UL (ref 3.9–12.7)

## 2021-04-26 PROCEDURE — 93010 EKG 12-LEAD: ICD-10-PCS | Mod: ,,, | Performed by: INTERNAL MEDICINE

## 2021-04-26 PROCEDURE — 93005 ELECTROCARDIOGRAM TRACING: CPT

## 2021-04-26 PROCEDURE — 36415 COLL VENOUS BLD VENIPUNCTURE: CPT | Performed by: ANESTHESIOLOGY

## 2021-04-26 PROCEDURE — 93010 ELECTROCARDIOGRAM REPORT: CPT | Mod: ,,, | Performed by: INTERNAL MEDICINE

## 2021-04-26 PROCEDURE — 85025 COMPLETE CBC W/AUTO DIFF WBC: CPT | Performed by: ANESTHESIOLOGY

## 2021-04-26 RX ORDER — SODIUM CHLORIDE, SODIUM LACTATE, POTASSIUM CHLORIDE, CALCIUM CHLORIDE 600; 310; 30; 20 MG/100ML; MG/100ML; MG/100ML; MG/100ML
INJECTION, SOLUTION INTRAVENOUS CONTINUOUS
Status: CANCELLED | OUTPATIENT
Start: 2021-04-26

## 2021-04-26 RX ORDER — ACETAMINOPHEN 500 MG
1000 TABLET ORAL
Status: CANCELLED | OUTPATIENT
Start: 2021-04-26 | End: 2021-04-26

## 2021-04-26 RX ORDER — LIDOCAINE HYDROCHLORIDE 10 MG/ML
0.5 INJECTION, SOLUTION EPIDURAL; INFILTRATION; INTRACAUDAL; PERINEURAL ONCE
Status: CANCELLED | OUTPATIENT
Start: 2021-04-26 | End: 2021-04-26

## 2021-04-26 RX ORDER — ALBUTEROL SULFATE 2.5 MG/.5ML
2.5 SOLUTION RESPIRATORY (INHALATION)
Status: CANCELLED | OUTPATIENT
Start: 2021-04-26 | End: 2021-04-26

## 2021-05-01 ENCOUNTER — LAB VISIT (OUTPATIENT)
Dept: INTERNAL MEDICINE | Facility: CLINIC | Age: 71
DRG: 742 | End: 2021-05-01
Payer: MEDICARE

## 2021-05-01 DIAGNOSIS — Z01.818 PRE-OP TESTING: ICD-10-CM

## 2021-05-01 PROCEDURE — U0003 INFECTIOUS AGENT DETECTION BY NUCLEIC ACID (DNA OR RNA); SEVERE ACUTE RESPIRATORY SYNDROME CORONAVIRUS 2 (SARS-COV-2) (CORONAVIRUS DISEASE [COVID-19]), AMPLIFIED PROBE TECHNIQUE, MAKING USE OF HIGH THROUGHPUT TECHNOLOGIES AS DESCRIBED BY CMS-2020-01-R: HCPCS | Performed by: OBSTETRICS & GYNECOLOGY

## 2021-05-01 PROCEDURE — U0005 INFEC AGEN DETEC AMPLI PROBE: HCPCS | Performed by: OBSTETRICS & GYNECOLOGY

## 2021-05-02 LAB — SARS-COV-2 RNA RESP QL NAA+PROBE: NOT DETECTED

## 2021-05-03 ENCOUNTER — TELEPHONE (OUTPATIENT)
Dept: GYNECOLOGIC ONCOLOGY | Facility: CLINIC | Age: 71
End: 2021-05-03

## 2021-05-03 ENCOUNTER — TELEPHONE (OUTPATIENT)
Dept: INTERNAL MEDICINE | Facility: CLINIC | Age: 71
End: 2021-05-03

## 2021-05-04 ENCOUNTER — ANESTHESIA (OUTPATIENT)
Dept: SURGERY | Facility: OTHER | Age: 71
DRG: 742 | End: 2021-05-04
Payer: MEDICARE

## 2021-05-04 ENCOUNTER — HOSPITAL ENCOUNTER (OUTPATIENT)
Facility: OTHER | Age: 71
Discharge: HOME | DRG: 742 | End: 2021-05-04
Attending: OBSTETRICS & GYNECOLOGY | Admitting: OBSTETRICS & GYNECOLOGY
Payer: MEDICARE

## 2021-05-04 VITALS
WEIGHT: 171.94 LBS | TEMPERATURE: 98 F | HEIGHT: 69 IN | HEART RATE: 62 BPM | OXYGEN SATURATION: 94 % | BODY MASS INDEX: 25.47 KG/M2 | DIASTOLIC BLOOD PRESSURE: 63 MMHG | SYSTOLIC BLOOD PRESSURE: 108 MMHG | RESPIRATION RATE: 16 BRPM

## 2021-05-04 DIAGNOSIS — N83.201 CYST OF RIGHT OVARY: ICD-10-CM

## 2021-05-04 DIAGNOSIS — Z90.722 S/P BSO (STATUS POST BILATERAL SALPINGO-OOPHORECTOMY): Primary | ICD-10-CM

## 2021-05-04 DIAGNOSIS — N83.8 OVARIAN MASS, RIGHT: ICD-10-CM

## 2021-05-04 PROCEDURE — 58661 PR LAP,RMV  ADNEXAL STRUCTURE: ICD-10-PCS | Mod: 51,50,, | Performed by: OBSTETRICS & GYNECOLOGY

## 2021-05-04 PROCEDURE — 58661 PR LAP,RMV  ADNEXAL STRUCTURE: ICD-10-PCS | Mod: AS,51,50, | Performed by: STUDENT IN AN ORGANIZED HEALTH CARE EDUCATION/TRAINING PROGRAM

## 2021-05-04 PROCEDURE — 37000008 HC ANESTHESIA 1ST 15 MINUTES: Performed by: OBSTETRICS & GYNECOLOGY

## 2021-05-04 PROCEDURE — 58662 LAPAROSCOPY EXCISE LESIONS: CPT | Mod: AS,22,, | Performed by: STUDENT IN AN ORGANIZED HEALTH CARE EDUCATION/TRAINING PROGRAM

## 2021-05-04 PROCEDURE — 63600175 PHARM REV CODE 636 W HCPCS: Performed by: OBSTETRICS & GYNECOLOGY

## 2021-05-04 PROCEDURE — 58662 LAPAROSCOPY EXCISE LESIONS: CPT | Mod: 22,,, | Performed by: OBSTETRICS & GYNECOLOGY

## 2021-05-04 PROCEDURE — 88305 TISSUE EXAM BY PATHOLOGIST: CPT | Performed by: STUDENT IN AN ORGANIZED HEALTH CARE EDUCATION/TRAINING PROGRAM

## 2021-05-04 PROCEDURE — 58661 LAPAROSCOPY REMOVE ADNEXA: CPT | Mod: AS,51,50, | Performed by: STUDENT IN AN ORGANIZED HEALTH CARE EDUCATION/TRAINING PROGRAM

## 2021-05-04 PROCEDURE — 71000033 HC RECOVERY, INTIAL HOUR: Performed by: OBSTETRICS & GYNECOLOGY

## 2021-05-04 PROCEDURE — 25000242 PHARM REV CODE 250 ALT 637 W/ HCPCS: Performed by: ANESTHESIOLOGY

## 2021-05-04 PROCEDURE — 58662 PR LAP,FULGURATE/EXCISE LESIONS: ICD-10-PCS | Mod: 22,,, | Performed by: OBSTETRICS & GYNECOLOGY

## 2021-05-04 PROCEDURE — 63600175 PHARM REV CODE 636 W HCPCS: Performed by: NURSE ANESTHETIST, CERTIFIED REGISTERED

## 2021-05-04 PROCEDURE — 63600175 PHARM REV CODE 636 W HCPCS: Performed by: ANESTHESIOLOGY

## 2021-05-04 PROCEDURE — 94761 N-INVAS EAR/PLS OXIMETRY MLT: CPT

## 2021-05-04 PROCEDURE — 36000708 HC OR TIME LEV III 1ST 15 MIN: Performed by: OBSTETRICS & GYNECOLOGY

## 2021-05-04 PROCEDURE — 94640 AIRWAY INHALATION TREATMENT: CPT

## 2021-05-04 PROCEDURE — 25000003 PHARM REV CODE 250: Performed by: ANESTHESIOLOGY

## 2021-05-04 PROCEDURE — P9045 ALBUMIN (HUMAN), 5%, 250 ML: HCPCS | Mod: JG | Performed by: ANESTHESIOLOGY

## 2021-05-04 PROCEDURE — 12000002 HC ACUTE/MED SURGE SEMI-PRIVATE ROOM

## 2021-05-04 PROCEDURE — 88305 TISSUE EXAM BY PATHOLOGIST: ICD-10-PCS | Mod: 26,,, | Performed by: STUDENT IN AN ORGANIZED HEALTH CARE EDUCATION/TRAINING PROGRAM

## 2021-05-04 PROCEDURE — 71000016 HC POSTOP RECOV ADDL HR: Performed by: OBSTETRICS & GYNECOLOGY

## 2021-05-04 PROCEDURE — 25000003 PHARM REV CODE 250: Performed by: NURSE ANESTHETIST, CERTIFIED REGISTERED

## 2021-05-04 PROCEDURE — 71000015 HC POSTOP RECOV 1ST HR: Performed by: OBSTETRICS & GYNECOLOGY

## 2021-05-04 PROCEDURE — 27201423 OPTIME MED/SURG SUP & DEVICES STERILE SUPPLY: Performed by: OBSTETRICS & GYNECOLOGY

## 2021-05-04 PROCEDURE — 36000709 HC OR TIME LEV III EA ADD 15 MIN: Performed by: OBSTETRICS & GYNECOLOGY

## 2021-05-04 PROCEDURE — 88305 TISSUE EXAM BY PATHOLOGIST: CPT | Mod: 26,,, | Performed by: STUDENT IN AN ORGANIZED HEALTH CARE EDUCATION/TRAINING PROGRAM

## 2021-05-04 PROCEDURE — 58662 PR LAP,FULGURATE/EXCISE LESIONS: ICD-10-PCS | Mod: AS,22,, | Performed by: STUDENT IN AN ORGANIZED HEALTH CARE EDUCATION/TRAINING PROGRAM

## 2021-05-04 PROCEDURE — 37000009 HC ANESTHESIA EA ADD 15 MINS: Performed by: OBSTETRICS & GYNECOLOGY

## 2021-05-04 PROCEDURE — 25000003 PHARM REV CODE 250: Performed by: OBSTETRICS & GYNECOLOGY

## 2021-05-04 PROCEDURE — 58661 LAPAROSCOPY REMOVE ADNEXA: CPT | Mod: 51,50,, | Performed by: OBSTETRICS & GYNECOLOGY

## 2021-05-04 PROCEDURE — 88307 TISSUE EXAM BY PATHOLOGIST: CPT | Performed by: STUDENT IN AN ORGANIZED HEALTH CARE EDUCATION/TRAINING PROGRAM

## 2021-05-04 RX ORDER — ALBUTEROL SULFATE 2.5 MG/.5ML
2.5 SOLUTION RESPIRATORY (INHALATION)
Status: COMPLETED | OUTPATIENT
Start: 2021-05-04 | End: 2021-05-04

## 2021-05-04 RX ORDER — CEFAZOLIN SODIUM 1 G/3ML
2 INJECTION, POWDER, FOR SOLUTION INTRAMUSCULAR; INTRAVENOUS
Status: COMPLETED | OUTPATIENT
Start: 2021-05-04 | End: 2021-05-04

## 2021-05-04 RX ORDER — OXYCODONE HYDROCHLORIDE 5 MG/1
5 TABLET ORAL EVERY 4 HOURS PRN
Status: CANCELLED | OUTPATIENT
Start: 2021-05-04

## 2021-05-04 RX ORDER — NEOSTIGMINE METHYLSULFATE 1 MG/ML
INJECTION, SOLUTION INTRAVENOUS
Status: DISCONTINUED | OUTPATIENT
Start: 2021-05-04 | End: 2021-05-04

## 2021-05-04 RX ORDER — MUPIROCIN 20 MG/G
OINTMENT TOPICAL
Status: DISCONTINUED | OUTPATIENT
Start: 2021-05-04 | End: 2021-05-04 | Stop reason: HOSPADM

## 2021-05-04 RX ORDER — LIDOCAINE HYDROCHLORIDE 10 MG/ML
INJECTION, SOLUTION EPIDURAL; INFILTRATION; INTRACAUDAL; PERINEURAL
Status: DISCONTINUED | OUTPATIENT
Start: 2021-05-04 | End: 2021-05-04 | Stop reason: HOSPADM

## 2021-05-04 RX ORDER — LIDOCAINE HYDROCHLORIDE 10 MG/ML
0.5 INJECTION, SOLUTION EPIDURAL; INFILTRATION; INTRACAUDAL; PERINEURAL ONCE
Status: DISCONTINUED | OUTPATIENT
Start: 2021-05-04 | End: 2021-05-04 | Stop reason: HOSPADM

## 2021-05-04 RX ORDER — MEPERIDINE HYDROCHLORIDE 25 MG/ML
12.5 INJECTION INTRAMUSCULAR; INTRAVENOUS; SUBCUTANEOUS ONCE AS NEEDED
Status: DISCONTINUED | OUTPATIENT
Start: 2021-05-04 | End: 2021-05-04 | Stop reason: HOSPADM

## 2021-05-04 RX ORDER — ONDANSETRON 2 MG/ML
4 INJECTION INTRAMUSCULAR; INTRAVENOUS EVERY 4 HOURS PRN
Status: CANCELLED | OUTPATIENT
Start: 2021-05-04

## 2021-05-04 RX ORDER — SODIUM CHLORIDE 0.9 % (FLUSH) 0.9 %
3 SYRINGE (ML) INJECTION
Status: DISCONTINUED | OUTPATIENT
Start: 2021-05-04 | End: 2021-05-04 | Stop reason: HOSPADM

## 2021-05-04 RX ORDER — OXYCODONE HYDROCHLORIDE 5 MG/1
5 TABLET ORAL
Status: DISCONTINUED | OUTPATIENT
Start: 2021-05-04 | End: 2021-05-04 | Stop reason: HOSPADM

## 2021-05-04 RX ORDER — HYDROMORPHONE HYDROCHLORIDE 2 MG/ML
0.4 INJECTION, SOLUTION INTRAMUSCULAR; INTRAVENOUS; SUBCUTANEOUS EVERY 5 MIN PRN
Status: DISCONTINUED | OUTPATIENT
Start: 2021-05-04 | End: 2021-05-04 | Stop reason: HOSPADM

## 2021-05-04 RX ORDER — OXYCODONE HYDROCHLORIDE 5 MG/1
5 TABLET ORAL EVERY 4 HOURS PRN
Qty: 10 TABLET | Refills: 0 | Status: SHIPPED | OUTPATIENT
Start: 2021-05-04 | End: 2021-05-19

## 2021-05-04 RX ORDER — DEXAMETHASONE SODIUM PHOSPHATE 4 MG/ML
INJECTION, SOLUTION INTRA-ARTICULAR; INTRALESIONAL; INTRAMUSCULAR; INTRAVENOUS; SOFT TISSUE
Status: DISCONTINUED | OUTPATIENT
Start: 2021-05-04 | End: 2021-05-04

## 2021-05-04 RX ORDER — DEXTROMETHORPHAN HYDROBROMIDE, GUAIFENESIN 5; 100 MG/5ML; MG/5ML
650 LIQUID ORAL EVERY 8 HOURS PRN
Qty: 30 TABLET | Refills: 0 | Status: SHIPPED | OUTPATIENT
Start: 2021-05-04 | End: 2021-05-19 | Stop reason: ALTCHOICE

## 2021-05-04 RX ORDER — ACETAMINOPHEN 500 MG
1000 TABLET ORAL
Status: COMPLETED | OUTPATIENT
Start: 2021-05-04 | End: 2021-05-04

## 2021-05-04 RX ORDER — ALBUMIN HUMAN 50 G/1000ML
SOLUTION INTRAVENOUS CONTINUOUS PRN
Status: DISCONTINUED | OUTPATIENT
Start: 2021-05-04 | End: 2021-05-04

## 2021-05-04 RX ORDER — SODIUM CHLORIDE 9 MG/ML
INJECTION, SOLUTION INTRAVENOUS CONTINUOUS
Status: CANCELLED | OUTPATIENT
Start: 2021-05-04

## 2021-05-04 RX ORDER — LIDOCAINE HYDROCHLORIDE 20 MG/ML
INJECTION INTRAVENOUS
Status: DISCONTINUED | OUTPATIENT
Start: 2021-05-04 | End: 2021-05-04

## 2021-05-04 RX ORDER — HEPARIN SODIUM 5000 [USP'U]/ML
5000 INJECTION, SOLUTION INTRAVENOUS; SUBCUTANEOUS ONCE
Status: DISCONTINUED | OUTPATIENT
Start: 2021-05-04 | End: 2021-05-04

## 2021-05-04 RX ORDER — ACETAMINOPHEN 500 MG
1000 TABLET ORAL EVERY 6 HOURS PRN
Status: CANCELLED | OUTPATIENT
Start: 2021-05-04

## 2021-05-04 RX ORDER — KETOROLAC TROMETHAMINE 30 MG/ML
15 INJECTION, SOLUTION INTRAMUSCULAR; INTRAVENOUS EVERY 6 HOURS PRN
Status: CANCELLED | OUTPATIENT
Start: 2021-05-04 | End: 2021-05-07

## 2021-05-04 RX ORDER — FENTANYL CITRATE 50 UG/ML
INJECTION, SOLUTION INTRAMUSCULAR; INTRAVENOUS
Status: DISCONTINUED | OUTPATIENT
Start: 2021-05-04 | End: 2021-05-04

## 2021-05-04 RX ORDER — ONDANSETRON 2 MG/ML
INJECTION INTRAMUSCULAR; INTRAVENOUS
Status: DISCONTINUED | OUTPATIENT
Start: 2021-05-04 | End: 2021-05-04

## 2021-05-04 RX ORDER — PROPOFOL 10 MG/ML
VIAL (ML) INTRAVENOUS
Status: DISCONTINUED | OUTPATIENT
Start: 2021-05-04 | End: 2021-05-04

## 2021-05-04 RX ORDER — ROCURONIUM BROMIDE 10 MG/ML
INJECTION, SOLUTION INTRAVENOUS
Status: DISCONTINUED | OUTPATIENT
Start: 2021-05-04 | End: 2021-05-04

## 2021-05-04 RX ORDER — KETOROLAC TROMETHAMINE 30 MG/ML
INJECTION, SOLUTION INTRAMUSCULAR; INTRAVENOUS
Status: DISCONTINUED | OUTPATIENT
Start: 2021-05-04 | End: 2021-05-04

## 2021-05-04 RX ORDER — SODIUM CHLORIDE, SODIUM LACTATE, POTASSIUM CHLORIDE, CALCIUM CHLORIDE 600; 310; 30; 20 MG/100ML; MG/100ML; MG/100ML; MG/100ML
INJECTION, SOLUTION INTRAVENOUS CONTINUOUS
Status: DISCONTINUED | OUTPATIENT
Start: 2021-05-04 | End: 2021-05-04 | Stop reason: HOSPADM

## 2021-05-04 RX ORDER — SENNOSIDES 8.6 MG/1
TABLET ORAL DAILY
Qty: 30 TABLET | Refills: 0 | Status: SHIPPED | OUTPATIENT
Start: 2021-05-04 | End: 2021-05-19

## 2021-05-04 RX ORDER — DIPHENHYDRAMINE HYDROCHLORIDE 50 MG/ML
25 INJECTION INTRAMUSCULAR; INTRAVENOUS EVERY 6 HOURS PRN
Status: DISCONTINUED | OUTPATIENT
Start: 2021-05-04 | End: 2021-05-04 | Stop reason: HOSPADM

## 2021-05-04 RX ORDER — HEPARIN SODIUM 5000 [USP'U]/ML
5000 INJECTION, SOLUTION INTRAVENOUS; SUBCUTANEOUS ONCE
Status: CANCELLED | OUTPATIENT
Start: 2021-05-04

## 2021-05-04 RX ORDER — LIDOCAINE HYDROCHLORIDE 10 MG/ML
1 INJECTION, SOLUTION EPIDURAL; INFILTRATION; INTRACAUDAL; PERINEURAL ONCE
Status: DISCONTINUED | OUTPATIENT
Start: 2021-05-04 | End: 2021-05-04 | Stop reason: HOSPADM

## 2021-05-04 RX ORDER — IBUPROFEN 600 MG/1
600 TABLET ORAL EVERY 6 HOURS PRN
Qty: 20 TABLET | Refills: 0 | Status: SHIPPED | OUTPATIENT
Start: 2021-05-04 | End: 2021-05-19

## 2021-05-04 RX ORDER — HYDROMORPHONE HYDROCHLORIDE 2 MG/ML
0.2 INJECTION, SOLUTION INTRAMUSCULAR; INTRAVENOUS; SUBCUTANEOUS
Status: CANCELLED | OUTPATIENT
Start: 2021-05-04

## 2021-05-04 RX ORDER — ONDANSETRON 2 MG/ML
4 INJECTION INTRAMUSCULAR; INTRAVENOUS DAILY PRN
Status: DISCONTINUED | OUTPATIENT
Start: 2021-05-04 | End: 2021-05-04 | Stop reason: HOSPADM

## 2021-05-04 RX ORDER — HEPARIN SODIUM 5000 [USP'U]/ML
INJECTION, SOLUTION INTRAVENOUS; SUBCUTANEOUS
Status: DISCONTINUED | OUTPATIENT
Start: 2021-05-04 | End: 2021-05-04 | Stop reason: HOSPADM

## 2021-05-04 RX ADMIN — ALBUMIN (HUMAN): 2.5 SOLUTION INTRAVENOUS at 07:05

## 2021-05-04 RX ADMIN — FENTANYL CITRATE 50 MCG: 50 INJECTION, SOLUTION INTRAMUSCULAR; INTRAVENOUS at 09:05

## 2021-05-04 RX ADMIN — SODIUM CHLORIDE, SODIUM LACTATE, POTASSIUM CHLORIDE, AND CALCIUM CHLORIDE: 600; 310; 30; 20 INJECTION, SOLUTION INTRAVENOUS at 06:05

## 2021-05-04 RX ADMIN — SODIUM CHLORIDE, SODIUM LACTATE, POTASSIUM CHLORIDE, AND CALCIUM CHLORIDE: 600; 310; 30; 20 INJECTION, SOLUTION INTRAVENOUS at 09:05

## 2021-05-04 RX ADMIN — ROCURONIUM BROMIDE 50 MG: 10 INJECTION, SOLUTION INTRAVENOUS at 07:05

## 2021-05-04 RX ADMIN — CEFAZOLIN 2 G: 330 INJECTION, POWDER, FOR SOLUTION INTRAMUSCULAR; INTRAVENOUS at 07:05

## 2021-05-04 RX ADMIN — GLYCOPYRROLATE 0.8 MCG: 0.2 INJECTION, SOLUTION INTRAMUSCULAR; INTRAVITREAL at 09:05

## 2021-05-04 RX ADMIN — LIDOCAINE HYDROCHLORIDE 100 MG: 20 INJECTION, SOLUTION INTRAVENOUS at 07:05

## 2021-05-04 RX ADMIN — ACETAMINOPHEN 1000 MG: 500 TABLET, FILM COATED ORAL at 05:05

## 2021-05-04 RX ADMIN — PROPOFOL 150 MG: 10 INJECTION, EMULSION INTRAVENOUS at 07:05

## 2021-05-04 RX ADMIN — NEOSTIGMINE METHYLSULFATE 5 MG: 1 INJECTION INTRAVENOUS at 09:05

## 2021-05-04 RX ADMIN — KETOROLAC TROMETHAMINE 30 MG: 30 INJECTION, SOLUTION INTRAMUSCULAR; INTRAVENOUS at 09:05

## 2021-05-04 RX ADMIN — FENTANYL CITRATE 100 MCG: 50 INJECTION, SOLUTION INTRAMUSCULAR; INTRAVENOUS at 07:05

## 2021-05-04 RX ADMIN — ONDANSETRON HYDROCHLORIDE 4 MG: 2 INJECTION INTRAMUSCULAR; INTRAVENOUS at 09:05

## 2021-05-04 RX ADMIN — DEXAMETHASONE SODIUM PHOSPHATE 8 MG: 4 INJECTION, SOLUTION INTRAMUSCULAR; INTRAVENOUS at 09:05

## 2021-05-04 RX ADMIN — FENTANYL CITRATE 50 MCG: 50 INJECTION, SOLUTION INTRAMUSCULAR; INTRAVENOUS at 08:05

## 2021-05-04 RX ADMIN — ALBUMIN (HUMAN): 2.5 SOLUTION INTRAVENOUS at 08:05

## 2021-05-04 RX ADMIN — ALBUTEROL SULFATE 2.5 MG: 2.5 SOLUTION RESPIRATORY (INHALATION) at 05:05

## 2021-05-04 RX ADMIN — MUPIROCIN: 20 OINTMENT TOPICAL at 05:05

## 2021-05-11 LAB
FINAL PATHOLOGIC DIAGNOSIS: NORMAL
GROSS: NORMAL
Lab: NORMAL

## 2021-05-14 ENCOUNTER — TELEPHONE (OUTPATIENT)
Dept: GYNECOLOGIC ONCOLOGY | Facility: CLINIC | Age: 71
End: 2021-05-14

## 2021-05-19 ENCOUNTER — OFFICE VISIT (OUTPATIENT)
Dept: GYNECOLOGIC ONCOLOGY | Facility: CLINIC | Age: 71
End: 2021-05-19
Payer: MEDICARE

## 2021-05-19 VITALS
DIASTOLIC BLOOD PRESSURE: 60 MMHG | HEART RATE: 103 BPM | SYSTOLIC BLOOD PRESSURE: 132 MMHG | BODY MASS INDEX: 24.94 KG/M2 | WEIGHT: 168.88 LBS

## 2021-05-19 DIAGNOSIS — Z90.722 S/P BSO (STATUS POST BILATERAL SALPINGO-OOPHORECTOMY): Primary | ICD-10-CM

## 2021-05-19 PROCEDURE — 3288F PR FALLS RISK ASSESSMENT DOCUMENTED: ICD-10-PCS | Mod: CPTII,S$GLB,, | Performed by: OBSTETRICS & GYNECOLOGY

## 2021-05-19 PROCEDURE — 3288F FALL RISK ASSESSMENT DOCD: CPT | Mod: CPTII,S$GLB,, | Performed by: OBSTETRICS & GYNECOLOGY

## 2021-05-19 PROCEDURE — 1126F PR PAIN SEVERITY QUANTIFIED, NO PAIN PRESENT: ICD-10-PCS | Mod: S$GLB,,, | Performed by: OBSTETRICS & GYNECOLOGY

## 2021-05-19 PROCEDURE — 1101F PT FALLS ASSESS-DOCD LE1/YR: CPT | Mod: CPTII,S$GLB,, | Performed by: OBSTETRICS & GYNECOLOGY

## 2021-05-19 PROCEDURE — 99999 PR PBB SHADOW E&M-EST. PATIENT-LVL III: ICD-10-PCS | Mod: PBBFAC,,, | Performed by: OBSTETRICS & GYNECOLOGY

## 2021-05-19 PROCEDURE — 99024 PR POST-OP FOLLOW-UP VISIT: ICD-10-PCS | Mod: S$GLB,,, | Performed by: OBSTETRICS & GYNECOLOGY

## 2021-05-19 PROCEDURE — 3008F PR BODY MASS INDEX (BMI) DOCUMENTED: ICD-10-PCS | Mod: CPTII,S$GLB,, | Performed by: OBSTETRICS & GYNECOLOGY

## 2021-05-19 PROCEDURE — 99999 PR PBB SHADOW E&M-EST. PATIENT-LVL III: CPT | Mod: PBBFAC,,, | Performed by: OBSTETRICS & GYNECOLOGY

## 2021-05-19 PROCEDURE — 99024 POSTOP FOLLOW-UP VISIT: CPT | Mod: S$GLB,,, | Performed by: OBSTETRICS & GYNECOLOGY

## 2021-05-19 PROCEDURE — 3008F BODY MASS INDEX DOCD: CPT | Mod: CPTII,S$GLB,, | Performed by: OBSTETRICS & GYNECOLOGY

## 2021-05-19 PROCEDURE — 1126F AMNT PAIN NOTED NONE PRSNT: CPT | Mod: S$GLB,,, | Performed by: OBSTETRICS & GYNECOLOGY

## 2021-05-19 PROCEDURE — 1101F PR PT FALLS ASSESS DOC 0-1 FALLS W/OUT INJ PAST YR: ICD-10-PCS | Mod: CPTII,S$GLB,, | Performed by: OBSTETRICS & GYNECOLOGY

## 2021-08-04 DIAGNOSIS — E78.5 HYPERLIPIDEMIA, UNSPECIFIED HYPERLIPIDEMIA TYPE: ICD-10-CM

## 2021-08-04 RX ORDER — ATORVASTATIN CALCIUM 80 MG/1
80 TABLET, FILM COATED ORAL NIGHTLY
Qty: 90 TABLET | Refills: 1 | Status: SHIPPED | OUTPATIENT
Start: 2021-08-04 | End: 2022-01-31 | Stop reason: SDUPTHER

## 2021-08-17 ENCOUNTER — TELEPHONE (OUTPATIENT)
Dept: INTERNAL MEDICINE | Facility: CLINIC | Age: 71
End: 2021-08-17

## 2021-10-05 ENCOUNTER — IMMUNIZATION (OUTPATIENT)
Dept: INTERNAL MEDICINE | Facility: CLINIC | Age: 71
End: 2021-10-05
Payer: MEDICARE

## 2021-10-05 DIAGNOSIS — Z23 NEED FOR VACCINATION: Primary | ICD-10-CM

## 2021-10-05 PROCEDURE — 91300 COVID-19, MRNA, LNP-S, PF, 30 MCG/0.3 ML DOSE VACCINE: CPT | Mod: PBBFAC | Performed by: INTERNAL MEDICINE

## 2021-10-05 PROCEDURE — 0003A COVID-19, MRNA, LNP-S, PF, 30 MCG/0.3 ML DOSE VACCINE: CPT | Mod: CV19,PBBFAC | Performed by: INTERNAL MEDICINE

## 2021-10-14 ENCOUNTER — TELEPHONE (OUTPATIENT)
Dept: INTERNAL MEDICINE | Facility: CLINIC | Age: 71
End: 2021-10-14

## 2021-10-14 DIAGNOSIS — Z12.31 ENCOUNTER FOR SCREENING MAMMOGRAM FOR BREAST CANCER: Primary | ICD-10-CM

## 2021-10-31 ENCOUNTER — HOSPITAL ENCOUNTER (OUTPATIENT)
Dept: RADIOLOGY | Facility: HOSPITAL | Age: 71
Discharge: HOME | End: 2021-10-31
Attending: FAMILY MEDICINE
Payer: MEDICARE

## 2021-10-31 DIAGNOSIS — Z12.31 ENCOUNTER FOR SCREENING MAMMOGRAM FOR BREAST CANCER: ICD-10-CM

## 2021-10-31 PROCEDURE — 77067 MAMMO DIGITAL SCREENING BILAT WITH TOMO: ICD-10-PCS | Mod: 26,,, | Performed by: RADIOLOGY

## 2021-10-31 PROCEDURE — 77063 BREAST TOMOSYNTHESIS BI: CPT | Mod: 26,,, | Performed by: RADIOLOGY

## 2021-10-31 PROCEDURE — 77067 SCR MAMMO BI INCL CAD: CPT | Mod: TC

## 2021-10-31 PROCEDURE — 77063 MAMMO DIGITAL SCREENING BILAT WITH TOMO: ICD-10-PCS | Mod: 26,,, | Performed by: RADIOLOGY

## 2021-10-31 PROCEDURE — 77067 SCR MAMMO BI INCL CAD: CPT | Mod: 26,,, | Performed by: RADIOLOGY

## 2021-11-02 ENCOUNTER — TELEPHONE (OUTPATIENT)
Dept: INTERNAL MEDICINE | Facility: CLINIC | Age: 71
End: 2021-11-02
Payer: MEDICARE

## 2021-11-15 ENCOUNTER — TELEPHONE (OUTPATIENT)
Dept: INTERNAL MEDICINE | Facility: CLINIC | Age: 71
End: 2021-11-15
Payer: MEDICARE

## 2022-01-28 ENCOUNTER — TELEPHONE (OUTPATIENT)
Dept: PRIMARY CARE CLINIC | Facility: CLINIC | Age: 72
End: 2022-01-28

## 2022-01-31 DIAGNOSIS — R73.03 PREDIABETES: Primary | ICD-10-CM

## 2022-01-31 DIAGNOSIS — E78.5 HYPERLIPIDEMIA, UNSPECIFIED HYPERLIPIDEMIA TYPE: ICD-10-CM

## 2022-01-31 NOTE — TELEPHONE ENCOUNTER
Care Due:                  Date            Visit Type   Department     Provider  --------------------------------------------------------------------------------                                             [facility]                                           MEDICINE/  Last Visit: 02-      None         INTERNAL MED   Sarita Live  Next Visit: 02-      None         None Found     Sarita Live                                                            Last  Test          Frequency    Reason                     Performed    Due Date  --------------------------------------------------------------------------------    CMP.........  12 months..  atorvastatin.............  Not Found    Overdue    Lipid Panel.  12 months..  atorvastatin.............  Not Found    Overdue    Powered by [employer]. Reference number: [id number].   1/31/2022 11:52:16 AM CST

## 2022-01-31 NOTE — TELEPHONE ENCOUNTER
Received fax requesting this refill  Pended/routing to provider  Checked allergies  Included LOV date

## 2022-02-01 RX ORDER — ATORVASTATIN CALCIUM 80 MG/1
80 TABLET, FILM COATED ORAL NIGHTLY
Qty: 90 TABLET | Refills: 0 | Status: SHIPPED | OUTPATIENT
Start: 2022-02-01 | End: 2022-02-09 | Stop reason: SDUPTHER

## 2022-02-01 NOTE — TELEPHONE ENCOUNTER
Please set her up for hyperlipidemia follow-up with me as soon as possible.  Please set her up for fasting labs as soon as possible as well.

## 2022-02-09 ENCOUNTER — OFFICE VISIT (OUTPATIENT)
Dept: PRIMARY CARE CLINIC | Facility: CLINIC | Age: 72
End: 2022-02-09
Payer: MEDICARE

## 2022-02-09 ENCOUNTER — LAB VISIT (OUTPATIENT)
Dept: LAB | Facility: HOSPITAL | Age: 72
End: 2022-02-09
Attending: FAMILY MEDICINE
Payer: MEDICARE

## 2022-02-09 VITALS
SYSTOLIC BLOOD PRESSURE: 116 MMHG | OXYGEN SATURATION: 100 % | HEART RATE: 53 BPM | BODY MASS INDEX: 26.58 KG/M2 | DIASTOLIC BLOOD PRESSURE: 72 MMHG | HEIGHT: 69 IN | RESPIRATION RATE: 18 BRPM | TEMPERATURE: 99 F | WEIGHT: 179.44 LBS

## 2022-02-09 DIAGNOSIS — Z72.0 TOBACCO USE: ICD-10-CM

## 2022-02-09 DIAGNOSIS — E78.5 HYPERLIPIDEMIA, UNSPECIFIED HYPERLIPIDEMIA TYPE: ICD-10-CM

## 2022-02-09 DIAGNOSIS — R19.5 POSITIVE COLORECTAL CANCER SCREENING USING COLOGUARD TEST: ICD-10-CM

## 2022-02-09 DIAGNOSIS — R73.03 PREDIABETES: ICD-10-CM

## 2022-02-09 DIAGNOSIS — Z12.11 SCREEN FOR COLON CANCER: ICD-10-CM

## 2022-02-09 DIAGNOSIS — R73.03 PREDIABETES: Primary | ICD-10-CM

## 2022-02-09 DIAGNOSIS — M85.80 OSTEOPENIA, UNSPECIFIED LOCATION: ICD-10-CM

## 2022-02-09 DIAGNOSIS — Z78.0 POST-MENOPAUSAL: ICD-10-CM

## 2022-02-09 LAB
ALBUMIN SERPL BCP-MCNC: 3.9 G/DL (ref 3.5–5.2)
ALP SERPL-CCNC: 94 U/L (ref 55–135)
ALT SERPL W/O P-5'-P-CCNC: 21 U/L (ref 10–44)
ANION GAP SERPL CALC-SCNC: 9 MMOL/L (ref 8–16)
AST SERPL-CCNC: 19 U/L (ref 10–40)
BASOPHILS # BLD AUTO: 0.02 K/UL (ref 0–0.2)
BASOPHILS NFR BLD: 0.3 % (ref 0–1.9)
BILIRUB SERPL-MCNC: 0.4 MG/DL (ref 0.1–1)
BUN SERPL-MCNC: 13 MG/DL (ref 8–23)
CALCIUM SERPL-MCNC: 9.7 MG/DL (ref 8.7–10.5)
CHLORIDE SERPL-SCNC: 103 MMOL/L (ref 95–110)
CHOLEST SERPL-MCNC: 276 MG/DL (ref 120–199)
CHOLEST/HDLC SERPL: 7.1 {RATIO} (ref 2–5)
CO2 SERPL-SCNC: 26 MMOL/L (ref 23–29)
CREAT SERPL-MCNC: 0.9 MG/DL (ref 0.5–1.4)
DIFFERENTIAL METHOD: ABNORMAL
EOSINOPHIL # BLD AUTO: 0 K/UL (ref 0–0.5)
EOSINOPHIL NFR BLD: 0 % (ref 0–8)
ERYTHROCYTE [DISTWIDTH] IN BLOOD BY AUTOMATED COUNT: 14.1 % (ref 11.5–14.5)
EST. GFR  (AFRICAN AMERICAN): >60 ML/MIN/1.73 M^2
EST. GFR  (NON AFRICAN AMERICAN): >60 ML/MIN/1.73 M^2
ESTIMATED AVG GLUCOSE: 117 MG/DL (ref 68–131)
GLUCOSE SERPL-MCNC: 96 MG/DL (ref 70–110)
HBA1C MFR BLD: 5.7 % (ref 4–5.6)
HCT VFR BLD AUTO: 43 % (ref 37–48.5)
HDLC SERPL-MCNC: 39 MG/DL (ref 40–75)
HDLC SERPL: 14.1 % (ref 20–50)
HGB BLD-MCNC: 14 G/DL (ref 12–16)
IMM GRANULOCYTES # BLD AUTO: 0.02 K/UL (ref 0–0.04)
IMM GRANULOCYTES NFR BLD AUTO: 0.3 % (ref 0–0.5)
LDLC SERPL CALC-MCNC: 208.4 MG/DL (ref 63–159)
LYMPHOCYTES # BLD AUTO: 3.1 K/UL (ref 1–4.8)
LYMPHOCYTES NFR BLD: 42.6 % (ref 18–48)
MCH RBC QN AUTO: 30.2 PG (ref 27–31)
MCHC RBC AUTO-ENTMCNC: 32.6 G/DL (ref 32–36)
MCV RBC AUTO: 93 FL (ref 82–98)
MONOCYTES # BLD AUTO: 0.5 K/UL (ref 0.3–1)
MONOCYTES NFR BLD: 6.6 % (ref 4–15)
NEUTROPHILS # BLD AUTO: 3.7 K/UL (ref 1.8–7.7)
NEUTROPHILS NFR BLD: 50.2 % (ref 38–73)
NONHDLC SERPL-MCNC: 237 MG/DL
NRBC BLD-RTO: 0 /100 WBC
PLATELET # BLD AUTO: 342 K/UL (ref 150–450)
PMV BLD AUTO: 8.8 FL (ref 9.2–12.9)
POTASSIUM SERPL-SCNC: 4.4 MMOL/L (ref 3.5–5.1)
PROT SERPL-MCNC: 8.2 G/DL (ref 6–8.4)
RBC # BLD AUTO: 4.64 M/UL (ref 4–5.4)
SODIUM SERPL-SCNC: 138 MMOL/L (ref 136–145)
TRIGL SERPL-MCNC: 143 MG/DL (ref 30–150)
TSH SERPL DL<=0.005 MIU/L-ACNC: 1.48 UIU/ML (ref 0.4–4)
WBC # BLD AUTO: 7.26 K/UL (ref 3.9–12.7)

## 2022-02-09 PROCEDURE — 3078F PR MOST RECENT DIASTOLIC BLOOD PRESSURE < 80 MM HG: ICD-10-PCS | Mod: CPTII,S$GLB,, | Performed by: FAMILY MEDICINE

## 2022-02-09 PROCEDURE — G0008 ADMIN INFLUENZA VIRUS VAC: HCPCS | Mod: S$GLB,,, | Performed by: FAMILY MEDICINE

## 2022-02-09 PROCEDURE — 36415 COLL VENOUS BLD VENIPUNCTURE: CPT | Mod: PN | Performed by: FAMILY MEDICINE

## 2022-02-09 PROCEDURE — 99214 PR OFFICE/OUTPT VISIT, EST, LEVL IV, 30-39 MIN: ICD-10-PCS | Mod: 25,S$GLB,, | Performed by: FAMILY MEDICINE

## 2022-02-09 PROCEDURE — 1126F PR PAIN SEVERITY QUANTIFIED, NO PAIN PRESENT: ICD-10-PCS | Mod: CPTII,S$GLB,, | Performed by: FAMILY MEDICINE

## 2022-02-09 PROCEDURE — 99999 PR PBB SHADOW E&M-EST. PATIENT-LVL IV: ICD-10-PCS | Mod: PBBFAC,,, | Performed by: FAMILY MEDICINE

## 2022-02-09 PROCEDURE — G0008 FLU VACCINE - QUADRIVALENT - ADJUVANTED: ICD-10-PCS | Mod: S$GLB,,, | Performed by: FAMILY MEDICINE

## 2022-02-09 PROCEDURE — 3008F PR BODY MASS INDEX (BMI) DOCUMENTED: ICD-10-PCS | Mod: CPTII,S$GLB,, | Performed by: FAMILY MEDICINE

## 2022-02-09 PROCEDURE — 90694 VACC AIIV4 NO PRSRV 0.5ML IM: CPT | Mod: S$GLB,,, | Performed by: FAMILY MEDICINE

## 2022-02-09 PROCEDURE — 80053 COMPREHEN METABOLIC PANEL: CPT | Performed by: FAMILY MEDICINE

## 2022-02-09 PROCEDURE — 85025 COMPLETE CBC W/AUTO DIFF WBC: CPT | Performed by: FAMILY MEDICINE

## 2022-02-09 PROCEDURE — 3078F DIAST BP <80 MM HG: CPT | Mod: CPTII,S$GLB,, | Performed by: FAMILY MEDICINE

## 2022-02-09 PROCEDURE — 90694 FLU VACCINE - QUADRIVALENT - ADJUVANTED: ICD-10-PCS | Mod: S$GLB,,, | Performed by: FAMILY MEDICINE

## 2022-02-09 PROCEDURE — 3074F SYST BP LT 130 MM HG: CPT | Mod: CPTII,S$GLB,, | Performed by: FAMILY MEDICINE

## 2022-02-09 PROCEDURE — 3288F FALL RISK ASSESSMENT DOCD: CPT | Mod: CPTII,S$GLB,, | Performed by: FAMILY MEDICINE

## 2022-02-09 PROCEDURE — 1101F PT FALLS ASSESS-DOCD LE1/YR: CPT | Mod: CPTII,S$GLB,, | Performed by: FAMILY MEDICINE

## 2022-02-09 PROCEDURE — 1159F PR MEDICATION LIST DOCUMENTED IN MEDICAL RECORD: ICD-10-PCS | Mod: CPTII,S$GLB,, | Performed by: FAMILY MEDICINE

## 2022-02-09 PROCEDURE — 1101F PR PT FALLS ASSESS DOC 0-1 FALLS W/OUT INJ PAST YR: ICD-10-PCS | Mod: CPTII,S$GLB,, | Performed by: FAMILY MEDICINE

## 2022-02-09 PROCEDURE — 83036 HEMOGLOBIN GLYCOSYLATED A1C: CPT | Performed by: FAMILY MEDICINE

## 2022-02-09 PROCEDURE — 3008F BODY MASS INDEX DOCD: CPT | Mod: CPTII,S$GLB,, | Performed by: FAMILY MEDICINE

## 2022-02-09 PROCEDURE — 3288F PR FALLS RISK ASSESSMENT DOCUMENTED: ICD-10-PCS | Mod: CPTII,S$GLB,, | Performed by: FAMILY MEDICINE

## 2022-02-09 PROCEDURE — 1159F MED LIST DOCD IN RCRD: CPT | Mod: CPTII,S$GLB,, | Performed by: FAMILY MEDICINE

## 2022-02-09 PROCEDURE — 80061 LIPID PANEL: CPT | Performed by: FAMILY MEDICINE

## 2022-02-09 PROCEDURE — 3074F PR MOST RECENT SYSTOLIC BLOOD PRESSURE < 130 MM HG: ICD-10-PCS | Mod: CPTII,S$GLB,, | Performed by: FAMILY MEDICINE

## 2022-02-09 PROCEDURE — 1126F AMNT PAIN NOTED NONE PRSNT: CPT | Mod: CPTII,S$GLB,, | Performed by: FAMILY MEDICINE

## 2022-02-09 PROCEDURE — 99214 OFFICE O/P EST MOD 30 MIN: CPT | Mod: 25,S$GLB,, | Performed by: FAMILY MEDICINE

## 2022-02-09 PROCEDURE — 99999 PR PBB SHADOW E&M-EST. PATIENT-LVL IV: CPT | Mod: PBBFAC,,, | Performed by: FAMILY MEDICINE

## 2022-02-09 PROCEDURE — 84443 ASSAY THYROID STIM HORMONE: CPT | Performed by: FAMILY MEDICINE

## 2022-02-09 RX ORDER — ATORVASTATIN CALCIUM 80 MG/1
80 TABLET, FILM COATED ORAL NIGHTLY
Qty: 90 TABLET | Refills: 3 | Status: SHIPPED | OUTPATIENT
Start: 2022-02-09 | End: 2023-04-25

## 2022-02-09 NOTE — PROGRESS NOTES
"Subjective:       Patient ID: Verito Lamb is a 71 y.o. female.    Chief Complaint: Annual Exam    HPI   70 y/o current smoker with HLPD, Vit d def, Prediabetes, Osteopenia is here for follow up.     She is feeling good, still smoking but trying to cut back, willing for smoking cessation. She denies f/n/v/d/constipation/cp/sob/urinary sx.  Appetite good.  She is sleeping ok. She is not doing dedicated exercises.     Tobacco use: 1/2 pack every few days if that  Prediabetes: a1c 5.7 9/2020  GYN: following with gyn/onc, mmg 10/2021, s/p lap BSO for L ovarian mass path benign  Osteopenia: 3/2020  cologuard positive: plans for colonoscopy did not set up   Eye exam due followed by Dr. Gomez  dentures  Vaccines: flu today    Review of Systems  see HPI  Objective:      /72 (BP Location: Right arm, Patient Position: Sitting, BP Method: Medium (Manual))   Pulse (!) 53   Temp 98.6 °F (37 °C) (Oral)   Resp 18   Ht 5' 9" (1.753 m)   Wt 81.4 kg (179 lb 7.3 oz)   SpO2 100%   BMI 26.50 kg/m²   Physical Exam  Vitals and nursing note reviewed.   Constitutional:       Appearance: She is well-developed and well-nourished.   HENT:      Head: Normocephalic and atraumatic.   Neck:      Thyroid: No thyromegaly.   Cardiovascular:      Rate and Rhythm: Normal rate and regular rhythm.      Heart sounds: Normal heart sounds.   Pulmonary:      Effort: Pulmonary effort is normal. No respiratory distress.      Breath sounds: Normal breath sounds.   Abdominal:      General: Abdomen is flat. Bowel sounds are normal. There is no distension.      Palpations: Abdomen is soft. There is no mass.      Tenderness: There is no abdominal tenderness.   Musculoskeletal:         General: No edema.      Cervical back: Normal range of motion and neck supple.      Right lower leg: No edema.      Left lower leg: No edema.   Lymphadenopathy:      Cervical: No cervical adenopathy.   Skin:     General: Skin is warm and dry.   Neurological:      Mental " Status: She is alert.   Psychiatric:         Mood and Affect: Mood and affect normal.         Assessment:       1. Prediabetes    2. Tobacco use    3. Positive colorectal cancer screening using Cologuard test    4. Screen for colon cancer    5. Hyperlipidemia, unspecified hyperlipidemia type    6. Osteopenia, unspecified location    7. Post-menopausal        Plan:   Verito was seen today for annual exam.    Diagnoses and all orders for this visit:    Prediabetes    Tobacco use  -     Ambulatory referral/consult to Smoking Cessation Program; Future    Positive colorectal cancer screening using Cologuard test  -     Case Request Endoscopy: COLONOSCOPY    Screen for colon cancer  -     Case Request Endoscopy: COLONOSCOPY    Hyperlipidemia, unspecified hyperlipidemia type    Osteopenia, unspecified location  -     DXA Bone Density Spine And Hip; Future    Post-menopausal  -     DXA Bone Density Spine And Hip; Future

## 2022-02-10 ENCOUNTER — TELEPHONE (OUTPATIENT)
Dept: PRIMARY CARE CLINIC | Facility: CLINIC | Age: 72
End: 2022-02-10

## 2022-02-10 NOTE — TELEPHONE ENCOUNTER
Please let her know her cholesterol is much higher than I would like.  Please find out if she had been taking her Lipitor regularly every day or if she had run out of it over the past few months and please let me know.    Her blood sugar is in the prediabetic range and the rest of her blood work looks good.  Please offer to send her a copy.

## 2022-03-03 ENCOUNTER — TELEPHONE (OUTPATIENT)
Dept: SMOKING CESSATION | Facility: CLINIC | Age: 72
End: 2022-03-03
Payer: MEDICARE

## 2022-03-03 NOTE — TELEPHONE ENCOUNTER
[facility]- called patient for no show scheduled intake appointment this morning. Left message to call back for Smoking Cessation  to reschedule.  Pili Carranza, [facility]  [phone] or [phone].

## 2022-04-05 ENCOUNTER — PES CALL (OUTPATIENT)
Dept: ADMINISTRATIVE | Facility: CLINIC | Age: 72
End: 2022-04-05
Payer: MEDICARE

## 2022-04-05 ENCOUNTER — NURSE TRIAGE (OUTPATIENT)
Dept: ADMINISTRATIVE | Facility: CLINIC | Age: 72
End: 2022-04-05
Payer: MEDICARE

## 2022-04-05 NOTE — TELEPHONE ENCOUNTER
Pt contacted [facility] returning a phone call where someone contacted her but did not leave a message. There is an encounter for a PES call on pt's chart but no note or name that called pt. Apologized to pt that I could not advise what they were calling for. Pt denied need for triage at this time. Advised [facility] is available 24/7 for further assistance.    Reason for Disposition   Information only question and nurse able to answer    Protocols used: INFORMATION ONLY CALL - NO TRIAGE-A-OH

## 2022-05-26 ENCOUNTER — PES CALL (OUTPATIENT)
Dept: ADMINISTRATIVE | Facility: CLINIC | Age: 72
End: 2022-05-26
Payer: MEDICARE

## 2022-06-14 ENCOUNTER — TELEPHONE (OUTPATIENT)
Dept: ENDOSCOPY | Facility: HOSPITAL | Age: 72
End: 2022-06-14
Payer: MEDICARE

## 2022-06-14 NOTE — TELEPHONE ENCOUNTER
Called patient to schedule colonoscopy. Patient stated she is not ready to schedule at this time. Direct number given to call back when ready to schedule.

## 2023-06-28 ENCOUNTER — HOSPITAL ENCOUNTER (OUTPATIENT)
Dept: RADIOLOGY | Facility: CLINIC | Age: 73
Discharge: HOME | End: 2023-06-28
Attending: FAMILY MEDICINE
Payer: MEDICARE

## 2023-06-28 DIAGNOSIS — M85.80 OSTEOPENIA, UNSPECIFIED LOCATION: ICD-10-CM

## 2023-06-28 DIAGNOSIS — Z13.820 SCREENING FOR OSTEOPOROSIS: ICD-10-CM

## 2023-06-28 DIAGNOSIS — Z78.0 POST-MENOPAUSAL: ICD-10-CM

## 2023-06-28 PROCEDURE — 77080 DXA BONE DENSITY AXIAL: CPT | Mod: 26,,, | Performed by: INTERNAL MEDICINE

## 2023-06-28 PROCEDURE — 77080 DXA BONE DENSITY AXIAL: CPT | Mod: TC

## 2023-06-28 PROCEDURE — 77080 DXA BONE DENSITY AXIAL SKELETON 1 OR MORE SITES: ICD-10-PCS | Mod: 26,,, | Performed by: INTERNAL MEDICINE

## 2023-07-14 ENCOUNTER — TELEPHONE (OUTPATIENT)
Dept: PRIMARY CARE CLINIC | Facility: CLINIC | Age: 73
End: 2023-07-14
Payer: MEDICARE

## 2023-07-14 NOTE — TELEPHONE ENCOUNTER
----- Message from Sarita Live MD sent at 7/6/2023 12:53 PM CDT -----  Please let pt know her bone density shows that she has osteopenia, this is a precursor to osteoporosis.  I recommend she take a calcium supplement of 1000 mg daily and Vitamin D3 2,000 IU daily.  She should try to incorporate some strength training into her routine.  We can repeat bone scan in 2-4 years.

## 2023-07-14 NOTE — TELEPHONE ENCOUNTER
Spoke with pt re: DEXA results. Pt aware an expresses understanding.    Pt states she already takes Anjana D and Calcium supplements. Could not verify doses. States she feels fine with what she's doing at this moment. She has no complaints re: bone pain/aches.

## 2023-07-23 DIAGNOSIS — E78.5 HYPERLIPIDEMIA, UNSPECIFIED HYPERLIPIDEMIA TYPE: ICD-10-CM

## 2023-07-24 RX ORDER — ATORVASTATIN CALCIUM 80 MG/1
TABLET, FILM COATED ORAL
Qty: 90 TABLET | Refills: 0 | Status: SHIPPED | OUTPATIENT
Start: 2023-07-24 | End: 2023-10-24

## 2023-07-24 NOTE — TELEPHONE ENCOUNTER
No care due was identified.  Health [facility] Embedded Care Due Messages. Reference number: [id number].   7/24/2023 10:22:50 AM CDT

## 2023-09-26 ENCOUNTER — TELEPHONE (OUTPATIENT)
Dept: PRIMARY CARE CLINIC | Facility: CLINIC | Age: 73
End: 2023-09-26
Payer: MEDICARE

## 2023-09-26 DIAGNOSIS — Z12.39 ENCOUNTER FOR SCREENING FOR MALIGNANT NEOPLASM OF BREAST, UNSPECIFIED SCREENING MODALITY: Primary | ICD-10-CM

## 2023-09-26 DIAGNOSIS — Z12.31 BREAST CANCER SCREENING BY MAMMOGRAM: ICD-10-CM

## 2023-09-26 NOTE — TELEPHONE ENCOUNTER
----- Message from Marva Jamil sent at 2023  8:44 AM CDT -----  Regarding: Pt called to request a new mammogram order due to the one in the system being  and pt would like a call back today once the order is ready for scheduling  Appointment Access Request:    Pt called to request a new mammogram order due to the one in the system being  and pt would like a call back today once the order is ready for scheduling    Pt can be reached at [phone]

## 2023-09-26 NOTE — TELEPHONE ENCOUNTER
----- Message from Marva Jamil sent at 2023  8:44 AM CDT -----  Regarding: Pt called to request a new mammogram order due to the one in the system being  and pt would like a call back today once the order is ready for scheduling  Appointment Access Request:    Pt called to request a new mammogram order due to the one in the system being  and pt would like a call back today once the order is ready for scheduling    Pt can be reached at [phone]

## 2023-10-21 DIAGNOSIS — E78.5 HYPERLIPIDEMIA, UNSPECIFIED HYPERLIPIDEMIA TYPE: ICD-10-CM

## 2023-10-21 NOTE — TELEPHONE ENCOUNTER
Care Due:                  Date            Visit Type   Department     Provider  --------------------------------------------------------------------------------                                EP -                              PRIMARY      [facility] Primary  Last Visit: 02-      CARE ([facility])   Care           Sarita Live                              EP -                              PRIMARY      [facility] Primary  Next Visit: 12-      CARE ([facility])   Care           Sarita Live                                                            Last  Test          Frequency    Reason                     Performed    Due Date  --------------------------------------------------------------------------------    CMP.........  12 months..  atorvastatin.............  02- 02-    Lipid Panel.  12 months..  atorvastatin.............  02- 02-    Health [facility] Embedded Care Due Messages. Reference number: [id number].   10/21/2023 5:20:58 AM CDT

## 2023-10-24 RX ORDER — ATORVASTATIN CALCIUM 80 MG/1
TABLET, FILM COATED ORAL
Qty: 90 TABLET | Refills: 0 | Status: SHIPPED | OUTPATIENT
Start: 2023-10-24 | End: 2023-12-20 | Stop reason: SDUPTHER

## 2023-12-11 ENCOUNTER — TELEPHONE (OUTPATIENT)
Dept: PRIMARY CARE CLINIC | Facility: CLINIC | Age: 73
End: 2023-12-11
Payer: MEDICARE

## 2023-12-11 NOTE — TELEPHONE ENCOUNTER
Spoke with pt, rescheduled annual appt. Scheduled annual labs prior. Pt aware an expresses understanding.

## 2023-12-11 NOTE — TELEPHONE ENCOUNTER
----- Message from [employer] sent at 12/11/2023  9:21 AM CST -----  Regarding: Pt called to reschedule her annual visit and pt would like a call back today due to leaving a previous message last week and no one called her back  Appointment Access Request:    Pt called to reschedule her annual visit and pt would like a call back today due to leaving a previous message last week and no one called her back    Pt can be reached at [phone]

## 2023-12-14 ENCOUNTER — HOSPITAL ENCOUNTER (OUTPATIENT)
Dept: RADIOLOGY | Facility: HOSPITAL | Age: 73
Discharge: HOME | End: 2023-12-14
Attending: FAMILY MEDICINE
Payer: MEDICARE

## 2023-12-14 VITALS — BODY MASS INDEX: 25.77 KG/M2 | WEIGHT: 174 LBS | HEIGHT: 69 IN

## 2023-12-14 DIAGNOSIS — Z12.31 BREAST CANCER SCREENING BY MAMMOGRAM: ICD-10-CM

## 2023-12-14 PROCEDURE — 77067 MAMMO DIGITAL SCREENING BILAT WITH TOMO: ICD-10-PCS | Mod: 26,,, | Performed by: RADIOLOGY

## 2023-12-14 PROCEDURE — 77063 MAMMO DIGITAL SCREENING BILAT WITH TOMO: ICD-10-PCS | Mod: 26,,, | Performed by: RADIOLOGY

## 2023-12-14 PROCEDURE — 77067 SCR MAMMO BI INCL CAD: CPT | Mod: TC

## 2023-12-14 PROCEDURE — 77067 SCR MAMMO BI INCL CAD: CPT | Mod: 26,,, | Performed by: RADIOLOGY

## 2023-12-14 PROCEDURE — 77063 BREAST TOMOSYNTHESIS BI: CPT | Mod: 26,,, | Performed by: RADIOLOGY

## 2023-12-20 ENCOUNTER — LAB VISIT (OUTPATIENT)
Dept: LAB | Facility: HOSPITAL | Age: 73
End: 2023-12-20
Attending: FAMILY MEDICINE
Payer: MEDICARE

## 2023-12-20 ENCOUNTER — OFFICE VISIT (OUTPATIENT)
Dept: PRIMARY CARE CLINIC | Facility: CLINIC | Age: 73
End: 2023-12-20
Payer: MEDICARE

## 2023-12-20 VITALS
WEIGHT: 178.81 LBS | HEIGHT: 69 IN | SYSTOLIC BLOOD PRESSURE: 130 MMHG | RESPIRATION RATE: 18 BRPM | DIASTOLIC BLOOD PRESSURE: 72 MMHG | OXYGEN SATURATION: 98 % | TEMPERATURE: 99 F | HEART RATE: 87 BPM | BODY MASS INDEX: 26.48 KG/M2

## 2023-12-20 DIAGNOSIS — M85.80 OSTEOPENIA, UNSPECIFIED LOCATION: ICD-10-CM

## 2023-12-20 DIAGNOSIS — Z72.0 TOBACCO ABUSE: ICD-10-CM

## 2023-12-20 DIAGNOSIS — E55.9 VITAMIN D DEFICIENCY DISEASE: ICD-10-CM

## 2023-12-20 DIAGNOSIS — E78.5 HYPERLIPIDEMIA, UNSPECIFIED HYPERLIPIDEMIA TYPE: Primary | ICD-10-CM

## 2023-12-20 DIAGNOSIS — Z12.11 SCREEN FOR COLON CANCER: ICD-10-CM

## 2023-12-20 DIAGNOSIS — R73.03 PREDIABETES: ICD-10-CM

## 2023-12-20 DIAGNOSIS — E78.5 HYPERLIPIDEMIA, UNSPECIFIED HYPERLIPIDEMIA TYPE: ICD-10-CM

## 2023-12-20 DIAGNOSIS — Z90.722 S/P BSO (STATUS POST BILATERAL SALPINGO-OOPHORECTOMY): ICD-10-CM

## 2023-12-20 DIAGNOSIS — R19.5 POSITIVE COLORECTAL CANCER SCREENING USING COLOGUARD TEST: ICD-10-CM

## 2023-12-20 LAB
ALBUMIN SERPL BCP-MCNC: 3.9 G/DL (ref 3.5–5.2)
ALP SERPL-CCNC: 88 U/L (ref 55–135)
ALT SERPL W/O P-5'-P-CCNC: 18 U/L (ref 10–44)
ANION GAP SERPL CALC-SCNC: 10 MMOL/L (ref 8–16)
AST SERPL-CCNC: 20 U/L (ref 10–40)
BASOPHILS # BLD AUTO: 0.01 K/UL (ref 0–0.2)
BASOPHILS NFR BLD: 0.1 % (ref 0–1.9)
BILIRUB SERPL-MCNC: 0.4 MG/DL (ref 0.1–1)
BUN SERPL-MCNC: 8 MG/DL (ref 8–23)
CALCIUM SERPL-MCNC: 9.5 MG/DL (ref 8.7–10.5)
CHLORIDE SERPL-SCNC: 109 MMOL/L (ref 95–110)
CHOLEST SERPL-MCNC: 263 MG/DL (ref 120–199)
CHOLEST/HDLC SERPL: 8.2 {RATIO} (ref 2–5)
CO2 SERPL-SCNC: 24 MMOL/L (ref 23–29)
CREAT SERPL-MCNC: 0.8 MG/DL (ref 0.5–1.4)
DIFFERENTIAL METHOD: ABNORMAL
EOSINOPHIL # BLD AUTO: 0 K/UL (ref 0–0.5)
EOSINOPHIL NFR BLD: 0 % (ref 0–8)
ERYTHROCYTE [DISTWIDTH] IN BLOOD BY AUTOMATED COUNT: 14.6 % (ref 11.5–14.5)
EST. GFR  (NO RACE VARIABLE): >60 ML/MIN/1.73 M^2
ESTIMATED AVG GLUCOSE: 114 MG/DL (ref 68–131)
GLUCOSE SERPL-MCNC: 86 MG/DL (ref 70–110)
HBA1C MFR BLD: 5.6 % (ref 4–5.6)
HCT VFR BLD AUTO: 40.8 % (ref 37–48.5)
HDLC SERPL-MCNC: 32 MG/DL (ref 40–75)
HDLC SERPL: 12.2 % (ref 20–50)
HGB BLD-MCNC: 13.6 G/DL (ref 12–16)
IMM GRANULOCYTES # BLD AUTO: 0.02 K/UL (ref 0–0.04)
IMM GRANULOCYTES NFR BLD AUTO: 0.3 % (ref 0–0.5)
LDLC SERPL CALC-MCNC: 195.4 MG/DL (ref 63–159)
LYMPHOCYTES # BLD AUTO: 2.8 K/UL (ref 1–4.8)
LYMPHOCYTES NFR BLD: 37.9 % (ref 18–48)
MCH RBC QN AUTO: 30.2 PG (ref 27–31)
MCHC RBC AUTO-ENTMCNC: 33.3 G/DL (ref 32–36)
MCV RBC AUTO: 91 FL (ref 82–98)
MONOCYTES # BLD AUTO: 0.6 K/UL (ref 0.3–1)
MONOCYTES NFR BLD: 7.7 % (ref 4–15)
NEUTROPHILS # BLD AUTO: 4 K/UL (ref 1.8–7.7)
NEUTROPHILS NFR BLD: 54 % (ref 38–73)
NONHDLC SERPL-MCNC: 231 MG/DL
NRBC BLD-RTO: 0 /100 WBC
PLATELET # BLD AUTO: 359 K/UL (ref 150–450)
PMV BLD AUTO: 9.6 FL (ref 9.2–12.9)
POTASSIUM SERPL-SCNC: 4 MMOL/L (ref 3.5–5.1)
PROT SERPL-MCNC: 7.8 G/DL (ref 6–8.4)
RBC # BLD AUTO: 4.5 M/UL (ref 4–5.4)
SODIUM SERPL-SCNC: 143 MMOL/L (ref 136–145)
TRIGL SERPL-MCNC: 178 MG/DL (ref 30–150)
TSH SERPL DL<=0.005 MIU/L-ACNC: 1.56 UIU/ML (ref 0.4–4)
WBC # BLD AUTO: 7.39 K/UL (ref 3.9–12.7)

## 2023-12-20 PROCEDURE — 1159F MED LIST DOCD IN RCRD: CPT | Mod: CPTII,S$GLB,, | Performed by: FAMILY MEDICINE

## 2023-12-20 PROCEDURE — 80061 LIPID PANEL: CPT | Performed by: FAMILY MEDICINE

## 2023-12-20 PROCEDURE — 80053 COMPREHEN METABOLIC PANEL: CPT | Performed by: FAMILY MEDICINE

## 2023-12-20 PROCEDURE — 3288F PR FALLS RISK ASSESSMENT DOCUMENTED: ICD-10-PCS | Mod: CPTII,S$GLB,, | Performed by: FAMILY MEDICINE

## 2023-12-20 PROCEDURE — 99999 PR PBB SHADOW E&M-EST. PATIENT-LVL IV: ICD-10-PCS | Mod: PBBFAC,,, | Performed by: FAMILY MEDICINE

## 2023-12-20 PROCEDURE — 3075F SYST BP GE 130 - 139MM HG: CPT | Mod: CPTII,S$GLB,, | Performed by: FAMILY MEDICINE

## 2023-12-20 PROCEDURE — 99214 PR OFFICE/OUTPT VISIT, EST, LEVL IV, 30-39 MIN: ICD-10-PCS | Mod: S$GLB,,, | Performed by: FAMILY MEDICINE

## 2023-12-20 PROCEDURE — 3008F PR BODY MASS INDEX (BMI) DOCUMENTED: ICD-10-PCS | Mod: CPTII,S$GLB,, | Performed by: FAMILY MEDICINE

## 2023-12-20 PROCEDURE — 1101F PR PT FALLS ASSESS DOC 0-1 FALLS W/OUT INJ PAST YR: ICD-10-PCS | Mod: CPTII,S$GLB,, | Performed by: FAMILY MEDICINE

## 2023-12-20 PROCEDURE — 1160F PR REVIEW ALL MEDS BY PRESCRIBER/CLIN PHARMACIST DOCUMENTED: ICD-10-PCS | Mod: CPTII,S$GLB,, | Performed by: FAMILY MEDICINE

## 2023-12-20 PROCEDURE — 3288F FALL RISK ASSESSMENT DOCD: CPT | Mod: CPTII,S$GLB,, | Performed by: FAMILY MEDICINE

## 2023-12-20 PROCEDURE — 3008F BODY MASS INDEX DOCD: CPT | Mod: CPTII,S$GLB,, | Performed by: FAMILY MEDICINE

## 2023-12-20 PROCEDURE — 1126F AMNT PAIN NOTED NONE PRSNT: CPT | Mod: CPTII,S$GLB,, | Performed by: FAMILY MEDICINE

## 2023-12-20 PROCEDURE — 99214 OFFICE O/P EST MOD 30 MIN: CPT | Mod: S$GLB,,, | Performed by: FAMILY MEDICINE

## 2023-12-20 PROCEDURE — 99999 PR PBB SHADOW E&M-EST. PATIENT-LVL IV: CPT | Mod: PBBFAC,,, | Performed by: FAMILY MEDICINE

## 2023-12-20 PROCEDURE — 83036 HEMOGLOBIN GLYCOSYLATED A1C: CPT | Performed by: FAMILY MEDICINE

## 2023-12-20 PROCEDURE — 1126F PR PAIN SEVERITY QUANTIFIED, NO PAIN PRESENT: ICD-10-PCS | Mod: CPTII,S$GLB,, | Performed by: FAMILY MEDICINE

## 2023-12-20 PROCEDURE — 1160F RVW MEDS BY RX/DR IN RCRD: CPT | Mod: CPTII,S$GLB,, | Performed by: FAMILY MEDICINE

## 2023-12-20 PROCEDURE — 36415 COLL VENOUS BLD VENIPUNCTURE: CPT | Mod: PN | Performed by: FAMILY MEDICINE

## 2023-12-20 PROCEDURE — 85025 COMPLETE CBC W/AUTO DIFF WBC: CPT | Performed by: FAMILY MEDICINE

## 2023-12-20 PROCEDURE — 3078F DIAST BP <80 MM HG: CPT | Mod: CPTII,S$GLB,, | Performed by: FAMILY MEDICINE

## 2023-12-20 PROCEDURE — 1101F PT FALLS ASSESS-DOCD LE1/YR: CPT | Mod: CPTII,S$GLB,, | Performed by: FAMILY MEDICINE

## 2023-12-20 PROCEDURE — 84443 ASSAY THYROID STIM HORMONE: CPT | Performed by: FAMILY MEDICINE

## 2023-12-20 PROCEDURE — 3078F PR MOST RECENT DIASTOLIC BLOOD PRESSURE < 80 MM HG: ICD-10-PCS | Mod: CPTII,S$GLB,, | Performed by: FAMILY MEDICINE

## 2023-12-20 PROCEDURE — 1159F PR MEDICATION LIST DOCUMENTED IN MEDICAL RECORD: ICD-10-PCS | Mod: CPTII,S$GLB,, | Performed by: FAMILY MEDICINE

## 2023-12-20 PROCEDURE — 3075F PR MOST RECENT SYSTOLIC BLOOD PRESS GE 130-139MM HG: ICD-10-PCS | Mod: CPTII,S$GLB,, | Performed by: FAMILY MEDICINE

## 2023-12-20 RX ORDER — VARENICLINE TARTRATE 0.5 (11)-1
KIT ORAL
Qty: 1 EACH | Refills: 0 | Status: SHIPPED | OUTPATIENT
Start: 2023-12-20

## 2023-12-20 RX ORDER — ATORVASTATIN CALCIUM 80 MG/1
80 TABLET, FILM COATED ORAL DAILY
Qty: 90 TABLET | Refills: 3 | Status: SHIPPED | OUTPATIENT
Start: 2023-12-20

## 2023-12-20 NOTE — PROGRESS NOTES
"Subjective:       Patient ID: Verito Lamb is a 73 y.o. female.    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     She is feeling good, still smoking, did not like smoking cessation as it was not helpful "all they did was talk and try to get her to go to a class", she is ok to try and take a pill to see if it will help.  She denies f/n/v/d/constipation/cp/sob/urinary sx.  Appetite good, trying to eat healthy, weight stable.  She is sleeping ok. She is not doing dedicated exercises. Mood good.      Tobacco use: 1/2 pack every few days if that  Prediabetes: a1c 5.7 2/2022  HLPD: lipitor 80 mg daily  GYN: following with gyn/onc, s/p lap BSO for L ovarian mass path benign, mmg 12/2023  Osteopenia: dexa 6/2023  cologuard positive: plans for colonoscopy did not set up   Eye exam due  dentures    Labs from today processing    Review of Systems  see HPI  Objective:      /72 (BP Location: Right arm, Patient Position: Sitting, BP Method: Small (Manual))   Pulse 87   Temp 98.5 °F (36.9 °C) (Oral)   Resp 18   Ht 5' 9" (1.753 m)   Wt 81.1 kg (178 lb 12.7 oz)   SpO2 98%   BMI 26.40 kg/m²   Physical Exam  Vitals and nursing note reviewed.   Constitutional:       Appearance: She is well-developed.   HENT:      Head: Normocephalic and atraumatic.      Mouth/Throat:      Pharynx: No oropharyngeal exudate or posterior oropharyngeal erythema.   Neck:      Thyroid: No " thyromegaly.   Cardiovascular:      Rate and Rhythm: Normal rate and regular rhythm.      Heart sounds: Normal heart sounds.   Pulmonary:      Effort: Pulmonary effort is normal. No respiratory distress.      Breath sounds: Normal breath sounds.   Abdominal:      General: Bowel sounds are normal. There is no distension.      Palpations: Abdomen is soft. There is no mass.      Tenderness: There is no abdominal tenderness.   Musculoskeletal:      Cervical back: Normal range of motion and neck supple.      Right lower leg: No edema.      Left lower leg: No edema.   Lymphadenopathy:      Cervical: No cervical adenopathy.   Skin:     General: Skin is warm and dry.   Neurological:      Mental Status: She is alert.         Assessment:       1. Hyperlipidemia, unspecified hyperlipidemia type    2. Prediabetes    3. Tobacco abuse    4. S/p Lap BSO    5. Vitamin D deficiency disease    6. Positive colorectal cancer screening using Cologuard test    7. Osteopenia, unspecified location    8. Screen for colon cancer        Plan:   Verito was seen today for annual exam.    Diagnoses and all orders for this visit:    Hyperlipidemia, unspecified hyperlipidemia type  -     Ambulatory referral/consult to Optometry; Future  -     atorvastatin (LIPITOR) 80 MG tablet; Take 1 tablet (80 mg total) by mouth once daily.    Prediabetes    Tobacco abuse  -     Ambulatory referral/consult to Optometry; Future  -     varenicline (CHANTIX STARTING MONTH BOX) 0.5 mg (11)- 1 mg (42) tablet; Take one 0.5mg tab by mouth once daily X3 days,then increase to one 0.5mg tab twice daily X4 days,then increase to one 1mg tab twice daily    S/p Lap BSO    Vitamin D deficiency disease    Positive colorectal cancer screening using Cologuard test  -     Ambulatory referral/consult to Endo Procedure ; Future    Osteopenia, unspecified location    Screen for colon cancer  -     Ambulatory referral/consult to Endo Procedure ; Future

## 2023-12-20 NOTE — PATIENT INSTRUCTIONS
Look into silver sneakers, I think you can join [facility] [employer] for free    Try to start walking 5 min a day and at times when you are stressed or angry and want to smoke    RSV vaccine at pharmacy

## 2023-12-21 ENCOUNTER — TELEPHONE (OUTPATIENT)
Dept: PRIMARY CARE CLINIC | Facility: CLINIC | Age: 73
End: 2023-12-21

## 2023-12-21 DIAGNOSIS — E78.5 HYPERLIPIDEMIA, UNSPECIFIED HYPERLIPIDEMIA TYPE: Primary | ICD-10-CM

## 2023-12-21 NOTE — TELEPHONE ENCOUNTER
"Spoke with pt re: lab results. Pt aware an expresses understanding.    Pt refuses to see Cardiology states "She's not running all around town seeing 's. Nothing is wrong with her heart. She's been taking those cholesterol pills for 8 years if it hasn't gone down by now its not going to."     Informed pt I will make MD aware she refuses and remove the referral. Pt expresses understanding.   "

## 2023-12-21 NOTE — TELEPHONE ENCOUNTER
----- Message from Sera Sanders sent at 12/21/2023 11:22 AM CST -----  Contact: Self/[phone]  [email or web address]     Patient is calling for Medical Advice regarding:pt not going to a Cardiologist     Would like response via Chance (app)t:  Would like a return call     Comments: pt stated that she needs a return call from the nurse to let her know she is not going to see a Cardiologist bc there is nothing wrong with heart . Please advise

## 2023-12-21 NOTE — TELEPHONE ENCOUNTER
----- Message from Sarita Live MD sent at 12/21/2023  9:51 AM CST -----  Please let her know her cholesterol is much higher than I would like, I recommend we get an opinion from Cardiology to see if we could start a medication like Repatha to help get your cholesterol at goal.  The rest of your blood work looks good.  Please offer to send her a copy of her labs.

## 2023-12-27 ENCOUNTER — TELEPHONE (OUTPATIENT)
Dept: ENDOSCOPY | Facility: HOSPITAL | Age: 73
End: 2023-12-27

## 2023-12-27 NOTE — TELEPHONE ENCOUNTER
Spoke to pt to schedule procedure(s) Colonoscopy       Physician to perform procedure(s) Dr. TAHIRA Sanchez  Date of Procedure (s) 4/25/24  Arrival Time 10:20 AM  Time of Procedure(s) 11:20 AM   Location of Procedure(s) [facility] 4th Floor  Type of Rx Prep sent to patient: Miralax  Instructions provided to patient via Postal Mail    Patient was informed on the following information and verbalized understanding. Screening questionnaire reviewed with patient and complete. If procedure requires anesthesia, a responsible adult needs to be present to accompany the patient home, patient cannot drive after receiving anesthesia. Appointment details are tentative, especially check-in time. Patient will receive a prep-op call 7 days prior to confirm check-in time for procedure. If applicable the patient should contact their pharmacy to verify Rx for procedure prep is ready for pick-up. Patient was advised to call the scheduling department at [phone] if pharmacy states no Rx is available. Patient was advised to call the endoscopy scheduling department if any questions or concerns arise.      [facility]oscopy Scheduling Department

## 2023-12-27 NOTE — TELEPHONE ENCOUNTER
Mailed to pt          Miralax Instructions for Colonoscopy      Date of procedure: 4/25/24 Arrive at: 10:20 AM    Location of Department: [address] [place] [id number]  Take the [facility] Elevators to 4th Floor Endoscopy Lab      As soon as possible:   your over the counter prep (MIRALAX) and DULCOLAX LAXATIVE TABLETS     On the day before your procedure   What You CAN do:   You may have clear liquids ONLY-see below for list.    Liquids That Are OK to Drink:   Water  Sports drinks (Gatorade, Power-Aid)  Coffee or tea (no cream or nondairy creamer)  Clear juices without pulp (apple, white grape)  Gelatin desserts (no fruit or toppings)  Clear soda (sprite, coke, ginger ale)  Chicken broth (until 12 midnight the night before procedure)      What You CANNOT do:   Do not EAT solid food, drink milk or anything colored red.  Do not drink alcohol.  Do not take oral medications within 1 hour of starting   each dose of MIRALAX.  No gum chewing or candy morning of procedure.    Note:   (Please disregard the MIRALAX bottle instructions).    It is not uncommon to experience some abdominal cramping, nausea and/or vomiting when taking the prep. If you have nausea and/or vomiting while taking the prep, stop drinking for 20 to 30 minutes then resume.    IMPORTANT: If you experience preparation-related symptoms (for example, nausea, bloating, or cramping), pause, or slow the rate of drinking the additional water until your symptoms decrease.    How to take prep:    MIRALAX Bowel Prep-One (1) bottle-8.3 oz. (238 grams).     You must drink water with each dose, and additional water after each dose.      DOSE 1--Day Before Procedure  4/24/24     Drink at least 6 to 8 glasses of clear liquids from time you wake up until you begin your prep and then continue until bedtime to avoid dehydration.       Step 1- 12:00 pm (NOON) - Mix the 8.3 oz. bottle of Miralax (238 grams) and 64 oz. of Gatorade/Power-Aid, place in the  refrigerator (do not add ice). Take four (4) Dulcolax (Bisacodyl) tablets with at least 8 oz. or more of clear liquids.    Step 2- 6:00 pm- Drink one 8 oz. glass of the Miralax/Gatorade prep every 15 minutes until half the mixture (32 oz./quart) is gone. Set a timer as a reminder. Refrigerate the remaining half of prep for dose 2. See below when to begin this step.     Drink additional water/clear liquids    DOSE 2--Day of the Procedure  4/25/24    Step 1- 2-3 AM.-Drink the 2nd half of the prep.   Step 2- You may continue drinking water/clear liquids until   4 hours before your colonoscopy or as directed by the scheduling nurse 7:20 AM.    For more information about your procedure, please watch this informational video. It is important to watch this animated consent video prior to your arrival.   If you haven't watched the video prior to arriving, you are required to watch it during admission which can cause delays.     Options for viewing:  Using a keyboard:  press and hold the control tab (Ctrl) and left mouse click to follow link          Colonoscopy Instructional Video                                                          OR    Type link address into your web browser's address bar:  [email or web address]    Using a mobile phone: tap on web address/link.     Comments:          IMPORTANT INFORMATION TO KNOW BEFORE YOUR PROCEDURE    [facility] 4th Floor    If your procedure requires the administration of anesthesia, it is necessary for a responsible adult to drive you home. (Medical Transportation, Uber, Lyft, Taxi, etc. may ONLY be used if a responsible adult is present to accompany you home.  The responsible adult CAN'T be the  of the service).      person must be available to return to pick you up within 15 minutes of being notified of discharge.     Due to the limited socially distant seating in our waiting room, please limit your guest (1) who  accompany you for this procedure. If someone accompanies you for this procedure into the facility:    Consider having them proceed to an area that is socially distant other than the lobby until a member of the medical team contacts them to provide an update after the procedure.     Also, please consider being dropped off and picked up from the facility.      Please bring a picture ID, insurance card, & copayment    Take Medications as directed below:        If you begin taking any blood thinning medications or injectable weight loss/diabetes medications (other than insulin) , please contact the endoscopy scheduling department listed below as soon as possible    If you are diabetic see the attached instruction sheet regarding your medication.     If you take HEART, BLOOD PRESSURE, SEIZURE, PAIN, LUNG (including inhalers/nebulizers), ANTI-REJECTION (transplant patients), or PSYCHIATRIC medications, please take at your regular times with a sip of water or as directed by the scheduling nurse.     Important contact information:    Endoscopy [phone] Hours of operation Monday-Friday 8:00-4:30pm.    Questions about insurance or financial obligations call [phone] or [phone].    If you have questions regarding the prep or need to reschedule, please call [phone]. After hours questions requiring immediate assistance, contact [facility] nurse line at [phone] or [phone].   NOTE:     On occasion, unforeseen circumstances may cause a delay in your procedure start time. We respect your time and appreciate your patience during these circumstances.      Comments:      Are you ready for your Colonoscopy?      __ If you take blood thinners,weight loss or diabetic injectable medications, have you stopped taking them according to your doctor's instructions before your procedures?  __ Have you stopped eating solid foods and followed a clear liquid diet a full day before your procedure  or followed the diet       guidelines indicated in your instructions?       REMINDER: NO BROTH AFTER MIDNIGHT THE DAY BEFORE PROCEDURE.   __ Have you completed all your prep solution? PLEASE DO NOT FOLLOW the insert/or box instructions from pharmacy)  __ Have you taken your blood pressure, heart, seizure, or other essential medications the morning of your procedure?  __ Have you planned for a ride to and from procedure?  (Medical Transportation, Uber, Lyft, Taxi, etc. may ONLY be used if a responsible adult is present to accompany you home). The responsible adult CANNOT be the  of the service.  person must be available to return and pick you up within 15 minutes of being notified of discharge.           Questions or Concerns?  Please call us!  [phone] (M-F) [phone] (Nights and weekends)    Listed below are some helpful tips:    Please bring protective cases for eyewear and hearing aids-wear comfortable clothing/shoes.  Follow prep instructions closely so you don't have to do it twice.  Bowel prep is prescription used to clean out the colon before a colonoscopy. The prep increases movement of your colon by causing you to have diarrhea (loose stools). Cleaning out stool from the colon helps your doctor to see in your colon clearly during this procedure.   It is important to stay hydrated before, during and after bowel prep to prevent loss of fluid (dehydration). You can have water and your choice of clear liquids. Reminder: these liquids you will drink in addition to the bowel prep.     Preparing the mixture:    First, mix the prep with water.  Make the taste better by adding a sugar free drink mix (Crystal Light) can improve the taste of your prep.   Use a large bore (opening) straw. Place towards the back of mouth (throat) as tolerated.  Prepare a prep mixture that is lightly chilled, but not ice-cold. Drinking a large amount of ice-cold liquid can make you feel very ill.  Avoid drinking any  RED beverages or eating popsicles with this color for the 24 hours leading up to your procedure. This color can look like blood in the colon.    Consuming the prep:    Take your time. If you feel ill, take a 15-minute break from drinking the prep mixture  Combat hunger and dehydration with clear liquids. Options like JELL-O, or popsicles will help.  Settle in with good reading material. The goal is to clean out 6 feet of colon, so you can plan on spending a good deal of time in the bathroom. Have some good reading material on-hand or an iPad ready to keep yourself entertained!  Keep yourself comfortable. We recommend applying personal hygiene wipes, Tucks pads and a soothing ointment, like A&D ointment, Desitin, or Vaseline to your bottom before starting and as needed to protect your skin.

## 2024-03-13 ENCOUNTER — TELEPHONE (OUTPATIENT)
Dept: OPTOMETRY | Facility: CLINIC | Age: 74
End: 2024-03-13
Payer: MEDICARE

## 2024-04-24 ENCOUNTER — TELEPHONE (OUTPATIENT)
Dept: ENDOSCOPY | Facility: HOSPITAL | Age: 74
End: 2024-04-24
Payer: MEDICARE

## 2024-04-24 NOTE — TELEPHONE ENCOUNTER
Patient was scheduled for Colonoscopy on 4/25/24 for positive Cologuard test. Patient states that she does not want to have procedure or complete prep. Explained to patient why test was ordered. Patient states that she is not having any issues and still does not want to proceed. Patient removed from schedule and order cancelled per patient request.

## 2024-06-26 ENCOUNTER — OFFICE VISIT (OUTPATIENT)
Dept: URGENT CARE | Facility: CLINIC | Age: 74
End: 2024-06-26
Payer: MEDICARE

## 2024-06-26 VITALS
HEART RATE: 110 BPM | WEIGHT: 178 LBS | HEIGHT: 69 IN | DIASTOLIC BLOOD PRESSURE: 63 MMHG | BODY MASS INDEX: 26.36 KG/M2 | OXYGEN SATURATION: 98 % | SYSTOLIC BLOOD PRESSURE: 128 MMHG | TEMPERATURE: 98 F

## 2024-06-26 DIAGNOSIS — H65.111 ACUTE ALLERGIC SEROUS OTITIS MEDIA OF RIGHT EAR: ICD-10-CM

## 2024-06-26 DIAGNOSIS — S93.491A SPRAIN OF OTHER LIGAMENT OF RIGHT ANKLE, INITIAL ENCOUNTER: Primary | ICD-10-CM

## 2024-06-26 DIAGNOSIS — J30.89 NON-SEASONAL ALLERGIC RHINITIS DUE TO OTHER ALLERGIC TRIGGER: ICD-10-CM

## 2024-06-26 DIAGNOSIS — H60.543 ACUTE ECZEMATOID OTITIS EXTERNA OF BOTH EARS: ICD-10-CM

## 2024-06-26 PROCEDURE — 99214 OFFICE O/P EST MOD 30 MIN: CPT | Mod: 25,S$GLB,, | Performed by: PHYSICIAN ASSISTANT

## 2024-06-26 PROCEDURE — 96372 THER/PROPH/DIAG INJ SC/IM: CPT | Mod: S$GLB,,, | Performed by: PHYSICIAN ASSISTANT

## 2024-06-26 RX ORDER — LORATADINE 10 MG/1
10 TABLET ORAL DAILY PRN
Qty: 30 TABLET | Refills: 0 | Status: SHIPPED | OUTPATIENT
Start: 2024-06-26 | End: 2024-07-26

## 2024-06-26 RX ORDER — DICLOFENAC SODIUM 10 MG/G
2 GEL TOPICAL 4 TIMES DAILY PRN
Qty: 150 G | Refills: 0 | Status: SHIPPED | OUTPATIENT
Start: 2024-06-26 | End: 2024-07-26

## 2024-06-26 RX ORDER — KETOROLAC TROMETHAMINE 30 MG/ML
30 INJECTION, SOLUTION INTRAMUSCULAR; INTRAVENOUS
Status: COMPLETED | OUTPATIENT
Start: 2024-06-26 | End: 2024-06-26

## 2024-06-26 RX ORDER — IBUPROFEN 800 MG/1
800 TABLET ORAL EVERY 6 HOURS PRN
Qty: 30 TABLET | Refills: 0 | Status: SHIPPED | OUTPATIENT
Start: 2024-06-27 | End: 2024-07-07

## 2024-06-26 RX ORDER — NEOMYCIN SULFATE, POLYMYXIN B SULFATE AND HYDROCORTISONE 10; 3.5; 1 MG/ML; MG/ML; [USP'U]/ML
3 SUSPENSION/ DROPS AURICULAR (OTIC) 4 TIMES DAILY PRN
Qty: 10 ML | Refills: 0 | Status: SHIPPED | OUTPATIENT
Start: 2024-06-26 | End: 2024-07-01

## 2024-06-26 RX ADMIN — KETOROLAC TROMETHAMINE 30 MG: 30 INJECTION, SOLUTION INTRAMUSCULAR; INTRAVENOUS at 12:06

## 2024-06-26 NOTE — LETTER
"  June 26, 2024      [facility] [place]  [address]  [facility] [address]  Phone: [phone]  Fax: [phone]       Patient: eVrito Lamb   YOB: 1950  Date of Visit: 06/26/2024    To Whom It May Concern:    Celso Lamb  was at [facility] on 06/26/2024. The patient may return to work/school on 6/27/24 with no restrictions. If you have any questions or concerns, or if I can be of further assistance, please do not hesitate to contact me.    Sincerely,        Melabi Andrade PA-C (Jackie)       "

## 2024-06-26 NOTE — PATIENT INSTRUCTIONS
Discussed with patient that if he/she is in pain today, only take tylenol due to toradol injection received in clinic. You can continue NSAIDS tomorrow such as ibuprofen (Motrin/Advil), naproxen (Aleve), Mobic (Meloxicam).     A sprain is an injury to the ligaments and capsule at a joint in the body. A strain is an injury to muscles or tendons. Immediate treatment of sprains or strains includes RICE - Rest, ice, compression and elevation to the affected joint or limb as needed.    Rest. Allow your injury to heal before you do slow movements.  Place an ice pack or a bag of frozen peas wrapped in a towel over the painful part. Never put ice right on the skin. Do not leave the ice on more than 10 to 15 minutes at a time. Ice after activity may help decrease pain and swelling. Never ice before stretching.  Prop your knee on pillows to help with swelling.  Use a splint/ brace if the doctor tells you to do this.    Please drink plenty of fluids.  Please get plenty of rest.      Pain Control  If not allergic, please take over the counter Tylenol (Acetaminophen) 650 mg every 4-6 hours and/or Motrin (Ibuprofen) 600-800 mg every 6-8 hours as directed for control of pain and/or fever.    If you were prescribed a narcotic medication, do not drive or operate heavy equipment or machinery while taking these medications.    If you were not prescribed an anti-inflammatory medication, and if you do not have any history of stomach/intestinal ulcers, or kidney disease, or are not taking a blood thinner such as Coumadin, Plavix, Pradaxa, Eloquis, or Xaralta for example, it is OK to take over the counter Ibuprofen or Advil or Motrin or Aleve as directed.  Do not take these medications on an empty stomach.    -----------------------------------------------------------------  Allergic Rhinitis/ Serous Otitis media (fluid in ear)  Recommend oral antihistamine (Claritin/zyrtec) +/- oral decongestant (pseudoephedrine) for<10 days as needed  for runny nose or ear congestion,  steroid nasal spray (flonase)    For nose bleeds; recommend nasal irrigation with a saline spray/gel (AYR nasal gel) or Netti Pot like device per their directions is also recommended.  Please drink plenty of fluids.  Please get plenty of rest.  If you  smoke, please stop smoking.      Discussed prescriptions and over-the-counter medicines to help with patient's symptoms:  A steroid nose spray (flonase) can help with a stuffy nose. It can also help with drainage down the back of your throat.  An antihistamine (loratadine,zyrtec,allegra, xyzal) can help with itching, sneezing, or runny nose.  An antihistamine eye drop can help with itchy eyes.  A decongestant (pseudoephedrine,  Phenylephrine) can help with a stuffy nose. Take <10 days for congestion and rhinorrhea. Once symptoms improve, proceed with loratadine/zyrtec once a day. These ingredients can keep you up all night, decrease appetite, feel jittery, and raise blood pressure with long term use.        Please remember that you have received care at an urgent care today. Urgent cares are not emergency rooms and are not equipped to handle life threatening emergencies and cannot rule in or out certain medical conditions and you may be released before all of your medical problems are known or treated. Please arrange follow up with your primary care physician or speciality clinic   (orthopedics) within 2-5 days if your signs and symptoms have not resolved or worsen. Patient can call our Referral Hotline at [phone] to make an appointment.    Please return here or go to the Emergency Department for any concerns or worsening of condition.Patient was educated on signs/symptoms that would warrant emergent medical attention. Patient verbalized understanding.  More trouble getting up from a chair, going up and down stairs, or walking  Pain, swelling, warmth, numbness, tingling, or discoloration in the calf below the injured or sore  knee  You are not feeling better in 2 or 3 days or you are feeling worse

## 2024-06-26 NOTE — PROGRESS NOTES
"Subjective:      Patient ID: Verito Lamb is a 73 y.o. female.    Vitals:  height is 5' 9" (1.753 m) and weight is 80.7 kg (178 lb). Her oral temperature is 98.1 °F (36.7 °C). Her blood pressure is 128/63 and her pulse is 110. Her oxygen saturation is 98%.     Chief Complaint: Ankle Injury    Verito Lamb is a 73 y.o. female with allergic rhinitis, asthma, prediabetes, hyperlipidemia who complains of right ankle injury after falling 5 days ago. Pt wrapped ankle with ACE wrap and experienced some relief. Swelling is still present. Pt is not experiencing any pain at this time, just tightness. She reports discomfort level rated 1/10 when walking.  Patient does not like taking oral medications. Patient has tried ice but reports it was too cold.      She reports itchy ears. She uses hair pins to scratch inside her ear.     Ankle Injury   The incident occurred 3 to 5 days ago. The incident occurred at home. The injury mechanism was a fall. The pain is present in the right ankle. The pain is at a severity of 1/10. The pain is mild. The pain has been Fluctuating since onset. Pertinent negatives include no inability to bear weight, loss of motion, loss of sensation, muscle weakness, numbness or tingling. Associated symptoms comments: swelling. She reports no foreign bodies present. The symptoms are aggravated by weight bearing (swelling). Treatments tried: wrapped. The treatment provided mild relief.       Constitution: Negative for chills, sweating, fatigue, fever and generalized weakness.   HENT:  Negative for congestion, postnasal drip, sinus pain, sinus pressure, sore throat, trouble swallowing and voice change.    Cardiovascular:  Negative for chest pain, leg swelling, palpitations and sob on exertion.   Respiratory:  Negative for cough, sputum production, shortness of breath, wheezing and asthma.    Gastrointestinal:  Negative for nausea and vomiting.   Musculoskeletal:  Positive for pain, joint swelling and muscle " ache. Negative for joint pain and abnormal ROM of joint.   Skin:  Negative for bruising.   Allergic/Immunologic: Positive for environmental allergies and seasonal allergies. Negative for eczema and asthma.   Neurological:  Negative for numbness.   Psychiatric/Behavioral:  Negative for nervous/anxious. The patient is not nervous/anxious.       Objective:     Physical Exam   Constitutional: She is oriented to person, place, and time. No distress.      Comments:Patient is awake and alert, sitting up in exam chair, speaking and answering in complete sentences   normal  HENT:   Head: Normocephalic and atraumatic.   Ears:   Right Ear: External ear normal. No swelling or tenderness. A middle ear effusion is present.   Left Ear: Tympanic membrane and external ear normal. No swelling or tenderness.  No middle ear effusion.      Comments: Xerotic patches seen to bilateral ear canal  Nose: No rhinorrhea or congestion.   Mouth/Throat: Mucous membranes are moist. No oropharyngeal exudate or posterior oropharyngeal erythema. Oropharynx is clear.   Eyes: Conjunctivae are normal. Pupils are equal, round, and reactive to light. Extraocular movement intact   Neck: Neck supple.   Cardiovascular: Normal rate, regular rhythm, normal heart sounds and normal pulses.   Pulmonary/Chest: Effort normal and breath sounds normal. No respiratory distress. She has no wheezes. She has no rhonchi. She has no rales.   Abdominal: Normal appearance.   Musculoskeletal: Normal range of motion.         General: Swelling present. No tenderness. Normal range of motion.      Cervical back: She exhibits no tenderness.      Right foot: Normal range of motion and normal capillary refill. Swelling present. No tenderness, bony tenderness or crepitus.      Left foot: Normal.        Feet:       Comments: Patient appears to be in mild pain, antalgic gait noted. There is swelling and tenderness over the lateral malleolus and dorsal aspect of midfoot. No tenderness  over the medial aspect of the ankle. The fifth metatarsal is not tender.    Lymphadenopathy:     She has no cervical adenopathy.   Neurological: She is alert and oriented to person, place, and time.   Skin: Skin is warm.   Psychiatric: Her behavior is normal. Mood, judgment and thought content normal.   Nursing note and vitals reviewed.      Assessment:     1. Sprain of other ligament of right ankle, initial encounter    2. Non-seasonal allergic rhinitis due to other allergic trigger    3. Acute allergic serous otitis media of right ear    4. Acute eczematoid otitis externa of both ears      Patient presents with clinical exam findings and history consistent with above.      On exam, patient is nontoxic appearing and vitals are stable.      Diagnostic testing results were reviewed and discussed with patient/guardian.   Tests ordered in clinic:  Order xray of right ankle; no xray at [facility] today  Previous progress notes/admissions/labs and medications were reviewed.      Plan:   Patient does not like flonase nasal spray. Takes Claritin  Discussed to use ice wrapped in a towel to avoid burns.     Sprain of other ligament of right ankle, initial encounter  -     ketorolac injection 30 mg  -     BANDAGE ELASTIC 4IN ACE NS  -     diclofenac sodium (VOLTAREN ARTHRITIS PAIN) 1 % Gel; Apply 2 g topically 4 (four) times daily as needed.  Dispense: 150 g; Refill: 0  -     ibuprofen (ADVIL,MOTRIN) 800 MG tablet; Take 1 tablet (800 mg total) by mouth every 6 (six) hours as needed for Pain.  Dispense: 30 tablet; Refill: 0  -     Ambulatory referral/consult to Internal Medicine  -     Ambulatory referral/consult to Orthopedics  -     XR ANKLE COMPLETE 3 VIEW RIGHT; Future; Expected date: 06/26/2024    Non-seasonal allergic rhinitis due to other allergic trigger  -     Ambulatory referral/consult to Internal Medicine  -     loratadine (CLARITIN) 10 mg tablet; Take 1 tablet (10 mg total) by mouth daily as needed for Allergies.   "Dispense: 30 tablet; Refill: 0    Acute allergic serous otitis media of right ear  -     Ambulatory referral/consult to Internal Medicine  -     loratadine (CLARITIN) 10 mg tablet; Take 1 tablet (10 mg total) by mouth daily as needed for Allergies.  Dispense: 30 tablet; Refill: 0    Acute eczematoid otitis externa of both ears  -     neomycin-polymyxin-hydrocortisone (CORTISPORIN) 3.5-10,000-1 mg/mL-unit/mL-% otic suspension; Place 3 drops into both ears 4 (four) times daily as needed (itchy ears).  Dispense: 10 mL; Refill: 0                    1) See orders for this visit as documented in the electronic medical record.  2) Symptomatic therapy suggested: use acetaminophen/ibuprofen every 6-8 hours prn pain or fever, push fluids.   3) Call or return to clinic prn if these symptoms worsen or fail to improve as anticipated.    Discussed results/diagnosis/plan with patient in clinic.  We had shared decision making for patient's treatment. Patient verbalized understanding and in agreement with current treatment plan.     Patient was instructed to return for re-evaluation with urgent care or PCP for continued outpatient workup and management if symptoms do not improve/worsening symptoms. Strict ED versus clinic precautions given in depth.    Discharge and follow-up instructions given verbally/printed with the patient who expressed understanding. The instructions and results are also available on [facility].              Mela "Emma Andrade PA-C          Patient Instructions   Discussed with patient that if he/she is in pain today, only take tylenol due to toradol injection received in clinic. You can continue NSAIDS tomorrow such as ibuprofen (Motrin/Advil), naproxen (Aleve), Mobic (Meloxicam).     A sprain is an injury to the ligaments and capsule at a joint in the body. A strain is an injury to muscles or tendons. Immediate treatment of sprains or strains includes RICE - Rest, ice, compression and elevation to the " affected joint or limb as needed.    Rest. Allow your injury to heal before you do slow movements.  Place an ice pack or a bag of frozen peas wrapped in a towel over the painful part. Never put ice right on the skin. Do not leave the ice on more than 10 to 15 minutes at a time. Ice after activity may help decrease pain and swelling. Never ice before stretching.  Prop your knee on pillows to help with swelling.  Use a splint/ brace if the doctor tells you to do this.    Please drink plenty of fluids.  Please get plenty of rest.      Pain Control  If not allergic, please take over the counter Tylenol (Acetaminophen) 650 mg every 4-6 hours and/or Motrin (Ibuprofen) 600-800 mg every 6-8 hours as directed for control of pain and/or fever.    If you were prescribed a narcotic medication, do not drive or operate heavy equipment or machinery while taking these medications.    If you were not prescribed an anti-inflammatory medication, and if you do not have any history of stomach/intestinal ulcers, or kidney disease, or are not taking a blood thinner such as Coumadin, Plavix, Pradaxa, Eloquis, or Xaralta for example, it is OK to take over the counter Ibuprofen or Advil or Motrin or Aleve as directed.  Do not take these medications on an empty stomach.    -----------------------------------------------------------------  Allergic Rhinitis/ Serous Otitis media (fluid in ear)  Recommend oral antihistamine (Claritin/zyrtec) +/- oral decongestant (pseudoephedrine) for<10 days as needed for runny nose or ear congestion,  steroid nasal spray (flonase)    For nose bleeds; recommend nasal irrigation with a saline spray/gel (AYR nasal gel) or Netti Pot like device per their directions is also recommended.  Please drink plenty of fluids.  Please get plenty of rest.  If you  smoke, please stop smoking.      Discussed prescriptions and over-the-counter medicines to help with patient's symptoms:  A steroid nose spray (flonase) can help  with a stuffy nose. It can also help with drainage down the back of your throat.  An antihistamine (loratadine,zyrtec,allegra, xyzal) can help with itching, sneezing, or runny nose.  An antihistamine eye drop can help with itchy eyes.  A decongestant (pseudoephedrine,  Phenylephrine) can help with a stuffy nose. Take <10 days for congestion and rhinorrhea. Once symptoms improve, proceed with loratadine/zyrtec once a day. These ingredients can keep you up all night, decrease appetite, feel jittery, and raise blood pressure with long term use.        Please remember that you have received care at an urgent care today. Urgent cares are not emergency rooms and are not equipped to handle life threatening emergencies and cannot rule in or out certain medical conditions and you may be released before all of your medical problems are known or treated. Please arrange follow up with your primary care physician or speciality clinic   (orthopedics) within 2-5 days if your signs and symptoms have not resolved or worsen. Patient can call our Referral Hotline at [phone] to make an appointment.    Please return here or go to the Emergency Department for any concerns or worsening of condition.Patient was educated on signs/symptoms that would warrant emergent medical attention. Patient verbalized understanding.  More trouble getting up from a chair, going up and down stairs, or walking  Pain, swelling, warmth, numbness, tingling, or discoloration in the calf below the injured or sore knee  You are not feeling better in 2 or 3 days or you are feeling worse

## 2024-11-18 ENCOUNTER — OFFICE VISIT (OUTPATIENT)
Dept: URGENT CARE | Facility: CLINIC | Age: 74
End: 2024-11-18
Payer: MEDICARE

## 2024-11-18 VITALS
TEMPERATURE: 98 F | OXYGEN SATURATION: 97 % | WEIGHT: 178 LBS | RESPIRATION RATE: 18 BRPM | DIASTOLIC BLOOD PRESSURE: 80 MMHG | BODY MASS INDEX: 26.29 KG/M2 | SYSTOLIC BLOOD PRESSURE: 129 MMHG | HEART RATE: 82 BPM

## 2024-11-18 DIAGNOSIS — L29.9 PRURITUS: ICD-10-CM

## 2024-11-18 DIAGNOSIS — L24.5 IRRITANT CONTACT DERMATITIS DUE TO OTHER CHEMICAL PRODUCTS: Primary | ICD-10-CM

## 2024-11-18 DIAGNOSIS — S00.81XA ABRASION, FACE WITHOUT INFECTION: ICD-10-CM

## 2024-11-18 PROCEDURE — 96372 THER/PROPH/DIAG INJ SC/IM: CPT | Mod: S$GLB,,, | Performed by: PHYSICIAN ASSISTANT

## 2024-11-18 PROCEDURE — 99213 OFFICE O/P EST LOW 20 MIN: CPT | Mod: 25,S$GLB,, | Performed by: PHYSICIAN ASSISTANT

## 2024-11-18 RX ORDER — DEXAMETHASONE SODIUM PHOSPHATE 10 MG/ML
10 INJECTION INTRAMUSCULAR; INTRAVENOUS
Status: COMPLETED | OUTPATIENT
Start: 2024-11-18 | End: 2024-11-18

## 2024-11-18 RX ORDER — HYDROXYZINE HYDROCHLORIDE 25 MG/1
25 TABLET, FILM COATED ORAL 2 TIMES DAILY PRN
Qty: 14 TABLET | Refills: 0 | Status: SHIPPED | OUTPATIENT
Start: 2024-11-18 | End: 2024-11-25

## 2024-11-18 RX ORDER — HYDROCORTISONE 25 MG/G
OINTMENT TOPICAL 2 TIMES DAILY PRN
Qty: 28.35 G | Refills: 0 | Status: SHIPPED | OUTPATIENT
Start: 2024-11-18 | End: 2024-11-25

## 2024-11-18 RX ADMIN — DEXAMETHASONE SODIUM PHOSPHATE 10 MG: 10 INJECTION INTRAMUSCULAR; INTRAVENOUS at 02:11

## 2024-11-18 NOTE — PATIENT INSTRUCTIONS
Recommend emollients such as vaseline or aquaphor.  STOP SCRUBBING FACE WITH TOWELS.    STOP RUBBING ALCOHOL AND NEOSPORIN.       Use hydrocortisone 2.5% ointment BID to face prn rash.   Counseling on topical steroids:  Patient counseled that the prolonged use of topical steroids can result in the increased appearance superficial blood vessels (telangiectasias), lightening (hypopigmentation), and thinning of the skin (atrophy). Patient understands to avoid using high potency steroids in skin folds, the groin, and the face.  The patient verbalized understanding of proper use and possible adverse effects of topical steroids.      Use Hydroxyzine 25 mg po TID prn pruritus. If it makes you sleepy, take an oral 2nd grass generation antihistamines (Claritin/Zyrtec/Allegra) during the day and oral 1st class generation anti-histamines (hydroxyzine/benadryl) at night. Antihistamines are diphenhydramine (Benadryl),  hydroxyzine (Atarax), loratadine (Claritin), cetirizine (Zyrtec), and fexofenadine(Allegra), and levocetirizine (Xyzal). Ok to use claritin (loratadine) 1-2 tabs po AM.     Systemic glucocorticoids may be added to antihistamine therapy for patients with prominent angioedema or if symptoms persist beyond a few days.   Discussed adverse side effects of recurrent/long term steroid use: elevated blood pressure elevated blood sugar, pancreatitis,glaucoma/cataracts, weight gain in face/abdomen/neck, round face (moon face), fluid retention in legs/lungs, mood swings, upset stomach, increased risk of infections, osteoporosis and increased risk of fractures, fatigue, loss of appetite, nausea and muscle weakness, thin skin, bruising and slower wound healing.      After skin heals:   Protect the area from the sun.  protect the area from the sun by seeking shade, wearing protective clothing or applying a broad-spectrum, water-resistant sunscreen with an SPF of 30 or higher (CeraVe Ultra-Light Moisturizing Lotion With SPF 30  (has a silver top to it). This will help minimize scarring, as the redness from a burn sometimes persists for weeks, especially in those with darker skin tones     Monitor for Large, firm scar tissue forms. This is a keloid and is more often seen in -Americans.   You can gently wash the wound with soap and water.   Always wash your hands before and after touching the wound.  Each time you change the dressing, look closely at the wound to be sure it is healing the right way. The wound may have a yellowish discharge, and this is normal.  Avoid picking the scab or scratching the site which may cause more irritation.  Do not soak in water or swim with an open wound.  Do not use hydrogen peroxide; it will cause the wound to dry out or delay wound healing/new skin growth.       Salicylic acid (St Xiomara Apricot peach scrub) to help exfoliate treat white heads. Use once skin heals in 1 month.      If not allergic, take Tylenol (Acetaminophen) 650 mg to  1 g every 6 hours as needed as needed for fever/pain and/or Motrin (Ibuprofen) 600 to 800 mg every 6 hours as needed for pain and/or fever      Please remember that you have received care at an urgent care today. Urgent cares are not emergency rooms and are not equipped to handle life threatening emergencies and cannot rule in or out certain medical conditions and you may be released before all of your medical problems are known or treated. Please arrange follow up with your primary care physician or speciality clinic  within 2-5 days if your signs and symptoms have not resolved or worsen. Patient can call our Referral Hotline at [phone] to make an appointment.    Please return here or go to the Emergency Department for any concerns or worsening of condition.Patient was educated on signs/symptoms that would warrant emergent medical attention. Patient verbalized understanding.  You start to have severe trouble breathing or swallowing (for example, you cannot speak  in full sentences).  The rash spreads over large parts of your body and most of your skin becomes red.  It is becoming hard to breathe, but you can still talk in full sentences.  You have a fever of 100.4°F (38°C) or higher or chills.  You have signs of a wound infection like swelling, redness, warmth, pain, or drainage from the wound.

## 2024-11-18 NOTE — LETTER
"  November 18, 2024      [facility]  [address]  [facility] [address]  Phone: [phone]  Fax: [phone]       Patient: Verito Lamb   YOB: 1950  Date of Visit: 11/18/2024    To Whom It May Concern:    Celso Lamb  was at [facility] on 11/18/2024. The patient may return to work/school on 11/19/24 with no restrictions. If you have any questions or concerns, or if I can be of further assistance, please do not hesitate to contact me.    Sincerely,        Melabi Andrade PA-C (Jackie)       "
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
## 2025-02-07 DIAGNOSIS — E78.5 HYPERLIPIDEMIA, UNSPECIFIED HYPERLIPIDEMIA TYPE: ICD-10-CM

## 2025-02-07 NOTE — TELEPHONE ENCOUNTER
----- Message from Sheryl sent at 2/7/2025 11:29 AM CST -----  Contact: Patient  [phone]  Requesting an RX refill or new RX.    Is this a refill or new RX: REFILL    RX name and strength (copy/paste from chart):atorvastatin (LIPITOR) 80 MG tablet      Is this a 30 day or 90 day RX:     Pharmacy name and phone # (copy/paste from chart):   [id number] [place] [address] [address] AT [facility] [address]  [facility] [address]  Phone: [phone] Fax: [phone]      The doctors have asked that we provide their patients with the following 2 reminders -- prescription refills can take up to 72 hours, and a friendly reminder that in the future you can use your [facility] account to request refills:

## 2025-02-07 NOTE — TELEPHONE ENCOUNTER
Care Due:                  Date            Visit Type   Department     Provider  --------------------------------------------------------------------------------                                EP -                              PRIMARY      [facility]  Last Visit: 12-      CARE ([facility])   Care           Sarita Live                              EP -                              PRIMARY      [facility]  Next Visit: 05-      CARE ([facility])   Care           Sarita Live                                                            Last  Test          Frequency    Reason                     Performed    Due Date  --------------------------------------------------------------------------------    Office Visit  6 months...  atorvastatin, varenicline  12- 06-    CMP.........  12 months..  atorvastatin, varenicline  12-   12-    Lipid Panel.  12 months..  atorvastatin.............  12-   12-    [facility] Embedded Care Due Messages. Reference number: [id number].   2/07/2025 11:38:17 AM CST

## 2025-02-09 RX ORDER — ATORVASTATIN CALCIUM 80 MG/1
80 TABLET, FILM COATED ORAL DAILY
Qty: 90 TABLET | Refills: 3 | Status: SHIPPED | OUTPATIENT
Start: 2025-02-09

## 2025-02-22 DIAGNOSIS — Z00.00 ENCOUNTER FOR MEDICARE ANNUAL WELLNESS EXAM: ICD-10-CM

## 2025-05-19 ENCOUNTER — TELEPHONE (OUTPATIENT)
Dept: PRIMARY CARE CLINIC | Facility: CLINIC | Age: 75
End: 2025-05-19
Payer: MEDICARE

## 2025-05-19 DIAGNOSIS — Z12.31 ENCOUNTER FOR SCREENING MAMMOGRAM FOR MALIGNANT NEOPLASM OF BREAST: Primary | ICD-10-CM

## 2025-05-19 NOTE — TELEPHONE ENCOUNTER
Spoke with pt. Pt called stating that she had to reschedule appt today due to transportation issues. Pt rescheduled on 06/10/25 at 11 am with Dr. Live.     Pt also wanted to schedule annual mammogram. Pt scheduled. Pt verbalized understanding.

## 2025-07-30 DIAGNOSIS — Z78.0 MENOPAUSE: ICD-10-CM

## (undated) DEVICE — SET CYSTO IRRIGATION UNIV SPIK

## (undated) DEVICE — SEE MEDLINE ITEM 157110

## (undated) DEVICE — SEAL UNIVERSAL 5MM-8MM XI

## (undated) DEVICE — OBTURATOR BLADELESS 8MM XI CLR

## (undated) DEVICE — SUT VICRYL PLUS 0 CT1 36IN

## (undated) DEVICE — SYR 10CC LUER LOCK

## (undated) DEVICE — Device

## (undated) DEVICE — PAD PERI POST REPLACEMNT

## (undated) DEVICE — JELLY SURGILUBE 5GR

## (undated) DEVICE — DEVICE ANC SW STAT FOLEY 6-24

## (undated) DEVICE — SOL CLEARIFY VISUALIZATION LAP

## (undated) DEVICE — NDL SPINAL 18GX3.5 SPINOCAN

## (undated) DEVICE — COVER TIP CURVED SCISSORS XI

## (undated) DEVICE — SEALER LIGASURE LAP 37CM 5MM

## (undated) DEVICE — SOL NS 1000CC

## (undated) DEVICE — SET TRI-LUMEN FILTERED TUBE

## (undated) DEVICE — KIT WING PAD POSITIONING

## (undated) DEVICE — BAG TISS RETRV MONARCH 10MM

## (undated) DEVICE — ADHESIVE DERMABOND ADVANCED

## (undated) DEVICE — SUT VICRYL 3-0 27 SH

## (undated) DEVICE — TROCAR ENDOPATH XCEL 5X100MM

## (undated) DEVICE — ELECTRODE REM PLYHSV RETURN 9

## (undated) DEVICE — INSERT CUSHIONPRONE VIEW LARGE

## (undated) DEVICE — GLOVE BIOGEL SKINSENSE PI 7.0

## (undated) DEVICE — NDL HYPO REG 25G X 1 1/2

## (undated) DEVICE — SOL WATER STRL IRR 1000ML

## (undated) DEVICE — DRAPE COLUMN DAVINCI XI

## (undated) DEVICE — BAG INZII TISS RETRV 12/15MM

## (undated) DEVICE — UNDERGLOVES BIOGEL PI SZ 7 LF

## (undated) DEVICE — SUT MCRYL PLUS 4-0 PS2 27IN

## (undated) DEVICE — IRRIGATOR ENDOSCOPY DISP.

## (undated) DEVICE — SEE MEDLINE ITEM 156923

## (undated) DEVICE — SEE MEDLINE ITEM 152622

## (undated) DEVICE — SOL ELECTROLUBE ANTI-STIC

## (undated) DEVICE — DRAPE ARM DAVINCI XI